# Patient Record
Sex: MALE | Race: BLACK OR AFRICAN AMERICAN | NOT HISPANIC OR LATINO | Employment: OTHER | URBAN - METROPOLITAN AREA
[De-identification: names, ages, dates, MRNs, and addresses within clinical notes are randomized per-mention and may not be internally consistent; named-entity substitution may affect disease eponyms.]

---

## 2017-07-17 ENCOUNTER — HOSPITAL ENCOUNTER (INPATIENT)
Facility: OTHER | Age: 77
LOS: 6 days | Discharge: HOME HEALTH | DRG: 853 | End: 2017-07-23
Attending: EMERGENCY MEDICINE | Admitting: INTERNAL MEDICINE
Payer: MEDICARE

## 2017-07-17 DIAGNOSIS — E11.8 TYPE 2 DIABETES MELLITUS WITH COMPLICATION, WITH LONG-TERM CURRENT USE OF INSULIN: ICD-10-CM

## 2017-07-17 DIAGNOSIS — I26.99 ACUTE PULMONARY EMBOLISM: ICD-10-CM

## 2017-07-17 DIAGNOSIS — I26.99 OTHER ACUTE PULMONARY EMBOLISM: ICD-10-CM

## 2017-07-17 DIAGNOSIS — Z99.2 ANEMIA IN CHRONIC KIDNEY DISEASE, ON CHRONIC DIALYSIS: ICD-10-CM

## 2017-07-17 DIAGNOSIS — R40.1 OBTUNDED: ICD-10-CM

## 2017-07-17 DIAGNOSIS — Z01.818 ENCOUNTER FOR INTUBATION: ICD-10-CM

## 2017-07-17 DIAGNOSIS — A41.9 SEPSIS DUE TO PNEUMONIA: Primary | ICD-10-CM

## 2017-07-17 DIAGNOSIS — R65.21 SEPTIC SHOCK: ICD-10-CM

## 2017-07-17 DIAGNOSIS — A41.9 SEPTIC SHOCK: ICD-10-CM

## 2017-07-17 DIAGNOSIS — R40.1 STUPOR: ICD-10-CM

## 2017-07-17 DIAGNOSIS — R41.82 ALTERED MENTAL STATE: ICD-10-CM

## 2017-07-17 DIAGNOSIS — D63.1 ANEMIA IN CHRONIC KIDNEY DISEASE, ON CHRONIC DIALYSIS: ICD-10-CM

## 2017-07-17 DIAGNOSIS — N18.6 ESRD ON DIALYSIS: ICD-10-CM

## 2017-07-17 DIAGNOSIS — D63.1 ANEMIA DUE TO PRE-ESRD TREATED WITH ERYTHROPOIETIN: ICD-10-CM

## 2017-07-17 DIAGNOSIS — E87.20 ACIDOSIS: ICD-10-CM

## 2017-07-17 DIAGNOSIS — N03.9 ANEMIA DUE TO PRE-ESRD TREATED WITH ERYTHROPOIETIN: ICD-10-CM

## 2017-07-17 DIAGNOSIS — R41.82 ALTERED MENTAL STATUS: ICD-10-CM

## 2017-07-17 DIAGNOSIS — R06.02 SOB (SHORTNESS OF BREATH): ICD-10-CM

## 2017-07-17 DIAGNOSIS — T50.905A MEDICATION REACTION, INITIAL ENCOUNTER: ICD-10-CM

## 2017-07-17 DIAGNOSIS — I26.99 PULMONARY EMBOLUS: ICD-10-CM

## 2017-07-17 DIAGNOSIS — N18.6 ESRD (END STAGE RENAL DISEASE): ICD-10-CM

## 2017-07-17 DIAGNOSIS — R41.82 ALTERED MENTAL STATUS, UNSPECIFIED ALTERED MENTAL STATUS TYPE: ICD-10-CM

## 2017-07-17 DIAGNOSIS — Z79.4 TYPE 2 DIABETES MELLITUS WITH COMPLICATION, WITH LONG-TERM CURRENT USE OF INSULIN: ICD-10-CM

## 2017-07-17 DIAGNOSIS — J18.9 SEPSIS DUE TO PNEUMONIA: Primary | ICD-10-CM

## 2017-07-17 DIAGNOSIS — D64.9 ANEMIA, UNSPECIFIED: ICD-10-CM

## 2017-07-17 DIAGNOSIS — J96.01 ACUTE RESPIRATORY FAILURE WITH HYPOXIA: ICD-10-CM

## 2017-07-17 DIAGNOSIS — N18.6 ANEMIA IN CHRONIC KIDNEY DISEASE, ON CHRONIC DIALYSIS: ICD-10-CM

## 2017-07-17 DIAGNOSIS — Z99.2 ESRD ON DIALYSIS: ICD-10-CM

## 2017-07-17 PROBLEM — E11.9 TYPE 2 DIABETES MELLITUS: Status: ACTIVE | Noted: 2017-07-17

## 2017-07-17 PROBLEM — N18.9 ANEMIA IN CHRONIC KIDNEY DISEASE: Status: ACTIVE | Noted: 2017-07-17

## 2017-07-17 PROBLEM — T82.599A MECHANICAL COMPLICATION OF VASCULAR DEVICE: Status: RESOLVED | Noted: 2017-07-17 | Resolved: 2017-07-17

## 2017-07-17 PROBLEM — T82.599A MECHANICAL COMPLICATION OF VASCULAR DEVICE: Status: ACTIVE | Noted: 2017-07-17

## 2017-07-17 LAB
ALBUMIN SERPL BCP-MCNC: 3.1 G/DL
ALLENS TEST: ABNORMAL
ALLENS TEST: ABNORMAL
ALP SERPL-CCNC: 84 U/L
ALT SERPL W/O P-5'-P-CCNC: 16 U/L
ANION GAP SERPL CALC-SCNC: 25 MMOL/L
AST SERPL-CCNC: 17 U/L
BASOPHILS # BLD AUTO: 0.01 K/UL
BASOPHILS NFR BLD: 0.1 %
BILIRUB SERPL-MCNC: 0.8 MG/DL
BNP SERPL-MCNC: 324 PG/ML
BUN SERPL-MCNC: 92 MG/DL
CALCIUM SERPL-MCNC: 8.8 MG/DL
CHLORIDE SERPL-SCNC: 100 MMOL/L
CO2 SERPL-SCNC: 16 MMOL/L
CREAT SERPL-MCNC: 10.8 MG/DL
DELSYS: ABNORMAL
DELSYS: ABNORMAL
DIFFERENTIAL METHOD: ABNORMAL
EOSINOPHIL # BLD AUTO: 0 K/UL
EOSINOPHIL NFR BLD: 0 %
ERYTHROCYTE [DISTWIDTH] IN BLOOD BY AUTOMATED COUNT: 14.6 %
ERYTHROCYTE [SEDIMENTATION RATE] IN BLOOD BY WESTERGREN METHOD: 16 MM/H
ERYTHROCYTE [SEDIMENTATION RATE] IN BLOOD BY WESTERGREN METHOD: 16 MM/H
EST. GFR  (AFRICAN AMERICAN): 5 ML/MIN/1.73 M^2
EST. GFR  (NON AFRICAN AMERICAN): 4 ML/MIN/1.73 M^2
GLUCOSE SERPL-MCNC: 117 MG/DL
HCO3 UR-SCNC: 13.3 MMOL/L (ref 24–28)
HCO3 UR-SCNC: 14.9 MMOL/L (ref 24–28)
HCT VFR BLD AUTO: 29.8 %
HGB BLD-MCNC: 10.4 G/DL
LYMPHOCYTES # BLD AUTO: 0.8 K/UL
LYMPHOCYTES NFR BLD: 6 %
MCH RBC QN AUTO: 32.8 PG
MCHC RBC AUTO-ENTMCNC: 34.9 %
MCV RBC AUTO: 94 FL
MODE: ABNORMAL
MODE: ABNORMAL
MONOCYTES # BLD AUTO: 1.2 K/UL
MONOCYTES NFR BLD: 9.8 %
NEUTROPHILS # BLD AUTO: 10.5 K/UL
NEUTROPHILS NFR BLD: 83.7 %
PCO2 BLDA: 27 MMHG (ref 35–45)
PCO2 BLDA: 36.8 MMHG (ref 35–45)
PEEP: 5
PEEP: 5
PH SMN: 7.22 [PH] (ref 7.35–7.45)
PH SMN: 7.3 [PH] (ref 7.35–7.45)
PLATELET # BLD AUTO: 205 K/UL
PMV BLD AUTO: 11.1 FL
PO2 BLDA: 267 MMHG (ref 80–100)
PO2 BLDA: 28 MMHG (ref 80–100)
POC BE: -13 MMOL/L
POC BE: -13 MMOL/L
POC SATURATED O2: 100 % (ref 95–100)
POC SATURATED O2: 42 % (ref 95–100)
POCT GLUCOSE: 135 MG/DL (ref 70–110)
POTASSIUM SERPL-SCNC: 4.3 MMOL/L
PROT SERPL-MCNC: 6.9 G/DL
RBC # BLD AUTO: 3.17 M/UL
SAMPLE: ABNORMAL
SAMPLE: ABNORMAL
SITE: ABNORMAL
SITE: ABNORMAL
SODIUM SERPL-SCNC: 141 MMOL/L
SP02: 60
SP02: 60
TROPONIN I SERPL DL<=0.01 NG/ML-MCNC: 0.02 NG/ML
VT: 480
VT: 480
WBC # BLD AUTO: 12.57 K/UL

## 2017-07-17 PROCEDURE — 99291 CRITICAL CARE FIRST HOUR: CPT | Mod: 25

## 2017-07-17 PROCEDURE — 25000003 PHARM REV CODE 250: Performed by: EMERGENCY MEDICINE

## 2017-07-17 PROCEDURE — 80053 COMPREHEN METABOLIC PANEL: CPT

## 2017-07-17 PROCEDURE — 05CY3ZZ EXTIRPATION OF MATTER FROM UPPER VEIN, PERCUTANEOUS APPROACH: ICD-10-PCS | Performed by: INTERNAL MEDICINE

## 2017-07-17 PROCEDURE — 93010 ELECTROCARDIOGRAM REPORT: CPT | Mod: ,,, | Performed by: INTERNAL MEDICINE

## 2017-07-17 PROCEDURE — 5A1945Z RESPIRATORY VENTILATION, 24-96 CONSECUTIVE HOURS: ICD-10-PCS | Performed by: HOSPITALIST

## 2017-07-17 PROCEDURE — 0BH17EZ INSERTION OF ENDOTRACHEAL AIRWAY INTO TRACHEA, VIA NATURAL OR ARTIFICIAL OPENING: ICD-10-PCS | Performed by: EMERGENCY MEDICINE

## 2017-07-17 PROCEDURE — 36556 INSERT NON-TUNNEL CV CATH: CPT

## 2017-07-17 PROCEDURE — 85025 COMPLETE CBC W/AUTO DIFF WBC: CPT

## 2017-07-17 PROCEDURE — 83880 ASSAY OF NATRIURETIC PEPTIDE: CPT

## 2017-07-17 PROCEDURE — 3E03317 INTRODUCTION OF OTHER THROMBOLYTIC INTO PERIPHERAL VEIN, PERCUTANEOUS APPROACH: ICD-10-PCS | Performed by: INTERNAL MEDICINE

## 2017-07-17 PROCEDURE — 87040 BLOOD CULTURE FOR BACTERIA: CPT

## 2017-07-17 PROCEDURE — 96374 THER/PROPH/DIAG INJ IV PUSH: CPT

## 2017-07-17 PROCEDURE — 96361 HYDRATE IV INFUSION ADD-ON: CPT

## 2017-07-17 PROCEDURE — 31500 INSERT EMERGENCY AIRWAY: CPT

## 2017-07-17 PROCEDURE — 63600175 PHARM REV CODE 636 W HCPCS: Performed by: EMERGENCY MEDICINE

## 2017-07-17 PROCEDURE — 94002 VENT MGMT INPAT INIT DAY: CPT

## 2017-07-17 PROCEDURE — 84484 ASSAY OF TROPONIN QUANT: CPT

## 2017-07-17 PROCEDURE — 82962 GLUCOSE BLOOD TEST: CPT

## 2017-07-17 PROCEDURE — 96376 TX/PRO/DX INJ SAME DRUG ADON: CPT

## 2017-07-17 PROCEDURE — 93005 ELECTROCARDIOGRAM TRACING: CPT

## 2017-07-17 PROCEDURE — 94003 VENT MGMT INPAT SUBQ DAY: CPT

## 2017-07-17 PROCEDURE — 20000000 HC ICU ROOM

## 2017-07-17 PROCEDURE — 02HV33Z INSERTION OF INFUSION DEVICE INTO SUPERIOR VENA CAVA, PERCUTANEOUS APPROACH: ICD-10-PCS | Performed by: EMERGENCY MEDICINE

## 2017-07-17 PROCEDURE — 057Y3DZ DILATION OF UPPER VEIN WITH INTRALUMINAL DEVICE, PERCUTANEOUS APPROACH: ICD-10-PCS | Performed by: INTERNAL MEDICINE

## 2017-07-17 RX ORDER — CALCIUM ACETATE 667 MG/1
667 CAPSULE ORAL
Status: ON HOLD | COMMUNITY
End: 2017-07-23 | Stop reason: HOSPADM

## 2017-07-17 RX ORDER — NALOXONE HYDROCHLORIDE 1 MG/ML
1 INJECTION INTRAMUSCULAR; INTRAVENOUS; SUBCUTANEOUS ONCE
Status: COMPLETED | OUTPATIENT
Start: 2017-07-17 | End: 2017-07-17

## 2017-07-17 RX ORDER — ASPIRIN 81 MG/1
81 TABLET ORAL DAILY
COMMUNITY

## 2017-07-17 RX ORDER — NALOXONE HCL 0.4 MG/ML
2 VIAL (ML) INJECTION
Status: COMPLETED | OUTPATIENT
Start: 2017-07-17 | End: 2017-07-17

## 2017-07-17 RX ORDER — NALOXONE HYDROCHLORIDE 1 MG/ML
1 INJECTION INTRAMUSCULAR; INTRAVENOUS; SUBCUTANEOUS
Status: DISCONTINUED | OUTPATIENT
Start: 2017-07-17 | End: 2017-07-17

## 2017-07-17 RX ORDER — DOCUSATE SODIUM 100 MG/1
100 CAPSULE, LIQUID FILLED ORAL DAILY PRN
Status: ON HOLD | COMMUNITY
End: 2017-07-23 | Stop reason: HOSPADM

## 2017-07-17 RX ORDER — NALOXONE HYDROCHLORIDE 1 MG/ML
1 INJECTION INTRAMUSCULAR; INTRAVENOUS; SUBCUTANEOUS
Status: DISCONTINUED | OUTPATIENT
Start: 2017-07-17 | End: 2017-07-23 | Stop reason: HOSPADM

## 2017-07-17 RX ORDER — INSULIN LISPRO 100 [IU]/ML
8 INJECTION, SOLUTION INTRAVENOUS; SUBCUTANEOUS EVERY MORNING
Status: ON HOLD | COMMUNITY
End: 2017-07-23

## 2017-07-17 RX ORDER — INSULIN LISPRO 100 [IU]/ML
10 INJECTION, SOLUTION INTRAVENOUS; SUBCUTANEOUS NIGHTLY
Status: ON HOLD | COMMUNITY
End: 2017-07-23 | Stop reason: HOSPADM

## 2017-07-17 RX ADMIN — NALOXONE HYDROCHLORIDE 2 MG: 0.4 INJECTION, SOLUTION INTRAMUSCULAR; INTRAVENOUS; SUBCUTANEOUS at 07:07

## 2017-07-17 RX ADMIN — SODIUM CHLORIDE 1000 ML: 0.9 INJECTION, SOLUTION INTRAVENOUS at 07:07

## 2017-07-17 RX ADMIN — NALOXONE HYDROCHLORIDE 1 MG: 1 INJECTION PARENTERAL at 07:07

## 2017-07-17 RX ADMIN — NALOXONE HYDROCHLORIDE 1 MG: 1 INJECTION PARENTERAL at 06:07

## 2017-07-17 RX ADMIN — IOHEXOL 100 ML: 350 INJECTION, SOLUTION INTRAVENOUS at 11:07

## 2017-07-17 NOTE — ED PROVIDER NOTES
"Encounter Date: 7/17/2017    SCRIBE #1 NOTE: I, Zaynab Sprague, am scribing for, and in the presence of,  Dr. Pyle. I have scribed the entire note.       History     Chief Complaint   Patient presents with    Altered Mental Status     per EMS, pt rec'ed conscious sedation to AV fistula declottment; "unarousable" after procedure     Time seen by provider: 6:25 PM    This is a 77 y.o. male with DM, HLD, HTN, anemia, and ESRD who presents via EMS with complaint of unresponsive state. On arrival patient is on bag valve mask. Per EMS and outside nursing, patient had a percutaneous thrombectomy with venous angioplasty performed this afternoon. He had conscious sedation and was administered Versed 2 mg IV and Fentanyl 100 mcg IV at approximately 16:00. After the patient became responsive he was discharged from the clinic. At that time he went to dialysis. Per dialysis nurse, upon arrival patient appeared drowsy, but states this is not uncommon. After 30 minutes of dialysis, the nurse reports the patient became increasingly drowsy and somnolent. EMS was activated at that time. Per EMS, patient had a CBG of 117 en route. They used a BVM due to slowed respirations.         The history is provided by the EMS personnel and medical records. The history is limited by the condition of the patient.     Review of patient's allergies indicates:  No Known Allergies  Past Medical History:   Diagnosis Date    Abnormal blood electrolyte level 06/28/2016    Anemia in chr kidney dis     Dependence on renal dialysis 06/03/2014    Diabetes mellitus     Hyperlipidemia     Hypertension     Infection and inflammatory reaction due to cardiac device, implant, and graft 10/26/2011    Secondary hyperparathyroidism of renal origin 08/15/2011    Unspecified protein-calorie malnutrition 06/18/2013    Vitamin D deficiency 06/28/2016     Past Surgical History:   Procedure Laterality Date    VASCULAR SURGERY       History reviewed. No " "pertinent family history.  Social History   Substance Use Topics    Smoking status: Never Smoker    Smokeless tobacco: Never Used    Alcohol use No     Review of Systems   Unable to perform ROS: Patient unresponsive       Physical Exam     Vitals:    07/17/17 1832 07/17/17 1833 07/17/17 1834 07/17/17 1847   BP:   (!) 142/111    Pulse:  96  90   Resp:  19  (!) 30   Temp:       TempSrc:       SpO2:  100% 99% 100%   Weight: 91 kg (200 lb 9.9 oz)      Height: 5' 9" (1.753 m)       07/17/17 1906 07/17/17 1908 07/17/17 1916 07/17/17 1918   BP: (!) 67/36 (!) 59/23  (!) 68/30   Pulse: 84 84  88   Resp: (!) 28 (!) 27  18   Temp:   (!) 95.2 °F (35.1 °C)    TempSrc:   Rectal    SpO2: 100% 100%  100%   Weight:       Height:           Physical Exam    Nursing note and vitals reviewed.  Constitutional: He appears well-developed and well-nourished. He is not diaphoretic. No distress. He is intubated.   Patient unresponsive.    HENT:   Head: Normocephalic and atraumatic.   Oropharynx is clear and intact. Moist mucous membranes.   Eyes:   Nystagmus. Conjunctiva are pink, clear, and intact.   Neck: Normal range of motion. Neck supple.   Cardiovascular: Normal rate, regular rhythm, S1 normal, S2 normal, normal heart sounds and intact distal pulses. Exam reveals no gallop and no friction rub.    No murmur heard.  Good pulses.   Pulmonary/Chest: He is intubated. No respiratory distress. He has no wheezes. He has no rhonchi. He has no rales.   Agonal respirations. Good breath sounds with BVM.   Abdominal: Soft. Bowel sounds are normal. He exhibits no distension. There is no tenderness. There is no rebound and no guarding.   No audible bruits.   Musculoskeletal: Normal range of motion. He exhibits no edema or tenderness.   No lower extremity edema.    Neurological: He is unresponsive.   GCS 3.   Skin: Skin is warm and dry. No rash and no abscess noted. No erythema. No pallor.         ED Course   Intubation  Date/Time: 7/17/2017 6:34 " "PM  Performed by: DIANE ROSENTHAL  Authorized by: DIANE ROSENTHAL   Consent Done: Emergent Situation  Indications: respiratory failure  Intubation method: lighted stylet  Patient status: unconscious  Preoxygenation: BVM  Paralytic: none  Laryngoscope size: Mac 4  Tube size: 7.5 mm  Number of attempts: 2  Ventilation between attempts: BVM  Cords visualized: yes  Post-procedure assessment: chest rise and CO2 detector  Breath sounds: reduced on left  ETT to teeth: 26 cm  Tube secured with: adhesive tape  Chest x-ray interpreted by me.  Chest x-ray findings: endotracheal tube too low  Tube repositioned: tube repositioned successfully  Patient tolerance: Patient tolerated the procedure well with no immediate complications  Complications: No  Specimens: No  Implants: No    Central Line  Date/Time: 7/17/2017 10:43 PM  Location procedure was performed: [facility] EMERGENCY DEPARTMENT  Performed by: DIANE ROSENTHAL  Consent Done: Emergent Situation  Time out: Immediately prior to procedure a "time out" was called to verify the correct patient, procedure, equipment, support staff and site/side marked as required.  Indications: vascular access and hemodynamic monitoring    Anesthesia:  Local Anesthetic: lidocaine 1% without epinephrine  Anesthetic total: 2 mL  Preparation: skin prepped with chlorhexidine (without alcohol) (and chloraprep)  Skin prep agent dried: skin prep agent completely dried prior to procedure  Sterile barriers: all five maximum sterile barriers used - cap, mask, sterile gown, sterile gloves, and large sterile sheet  Hand hygiene: hand hygiene performed prior to central venous catheter insertion  Location details: right femoral  Site selection rationale: hemodialysis patient, right IJ not visualized, no blood flow visualized to left IJ, dialysis site on left.  Catheter type: triple lumen  Catheter size: 7 Fr  Ultrasound guidance: yes  Vessel Caliber: large, patent, compressibility normal  Vascular " Doppler: not done  Manometry: No   Number of attempts: 2  Estimated blood loss (mL): 2  Specimens: No  Implants: No  Post-procedure: line sutured and chlorhexidine patch  Complications: No    Critical Care  Date/Time: 7/17/2017 6:25 PM  Performed by: DIANE ROSENTHAL  Authorized by: DIANE ROSENTHAL   Direct patient critical care time: 65 minutes  Additional history critical care time: 10 minutes  Ordering / reviewing critical care time: 15 minutes  Documentation critical care time: 15 minutes  Consulting other physicians critical care time: 20 minutes  Consult with family critical care time: 10 minutes  Total critical care time (exclusive of procedural time) : 135 minutes  Critical care time was exclusive of separately billable procedures and treating other patients and teaching time.  Critical care was necessary to treat or prevent imminent or life-threatening deterioration of the following conditions: respiratory failure.  Critical care was time spent personally by me on the following activities: blood draw for specimens, development of treatment plan with patient or surrogate, discussions with consultants, interpretation of cardiac output measurements, evaluation of patient's response to treatment, examination of patient, obtaining history from patient or surrogate, ordering and performing treatments and interventions, ordering and review of laboratory studies, ordering and review of radiographic studies, review of old charts, re-evaluation of patient's condition and pulse oximetry.        Labs Reviewed   CBC W/ AUTO DIFFERENTIAL - Abnormal; Notable for the following:        Result Value    RBC 3.17 (*)     Hemoglobin 10.4 (*)     Hematocrit 29.8 (*)     MCH 32.8 (*)     RDW 14.6 (*)     Gran # 10.5 (*)     Lymph # 0.8 (*)     Mono # 1.2 (*)     Gran% 83.7 (*)     Lymph% 6.0 (*)     All other components within normal limits   COMPREHENSIVE METABOLIC PANEL - Abnormal; Notable for the following:     CO2 16 (*)      Glucose 117 (*)     BUN, Bld 92 (*)     Creatinine 10.8 (*)     Albumin 3.1 (*)     Anion Gap 25 (*)     eGFR if  5 (*)     eGFR if non  4 (*)     All other components within normal limits   POCT GLUCOSE - Abnormal; Notable for the following:     POCT Glucose 135 (*)     All other components within normal limits   TROPONIN I   B-TYPE NATRIURETIC PEPTIDE   POCT GLUCOSE MONITORING CONTINUOUS     EKG Readings: (Independently Interpreted)   Initial Reading: No STEMI.   Sinus tachycardia. Rate of 115. RBBB. No acute changes when compared to prior EKG.       X-Rays:   Independently Interpreted Readings:   Chest X-Ray: Post intubation: Endotracheal tube is above the socorro and tilted to the right.      Medical Decision Making:   History:   Old Medical Records: I decided to obtain old medical records.  Old Records Summarized: records from previous admission(s), records from clinic visits and records from another hospital.  Independently Interpreted Test(s):   I have ordered and independently interpreted X-rays - see prior notes.  I have ordered and independently interpreted EKG Reading(s) - see prior notes  Clinical Tests:   Lab Tests: Ordered and Reviewed  Radiological Study: Ordered and Reviewed  Medical Tests: Ordered and Reviewed  ED Management:  8:46 PM I consulted and discussed the case with the moonlighter who will come down and admit the patient to their services.            Scribe Attestation:   Scribe #1: I performed the above scribed service and the documentation accurately describes the services I performed. I attest to the accuracy of the note.    Attending Attestation:           Physician Attestation for Scribe:  Physician Attestation Statement for Scribe #1: I, Dr. Pyle, reviewed documentation, as scribed by Zaynab Sprague in my presence, and it is both accurate and complete.         Attending ED Notes:   Emergent and critical evaluation of 77-year-old male with agonal  respirations and unresponsive with a GCS of 15.  On arrival patient is on bag valve mask. Per EMS and outside nursing, patient had a percutaneous thrombectomy with venous angioplasty performed this afternoon. He had conscious sedation and was administered Versed 2 mg IV and Fentanyl 100 mcg IV at approximately 16:00.  Patient is immediately intubated and placed on ventilator.  Patient found to be hypotensive and bolused with IV normal saline with increase in blood pressure.  Despite infiltration of Narcan and bolus of IV normal saline patient's pressure dropped.  Central line was placed.  Time central line was placed patient's blood pressure was 99/61.  Patient has no elevation of white blood cell count.  H&H 10.4 and 29.8.  BUN/creatinine are 92 and 10.8.  CO2 of 16.  Blood glucose of 117.  Albumen 3.1.  Anion gap of 25.  BNP is 324.  No acute findings on CT of the head.  No acute findings on chest x-ray.  The patient and his family members are extensively counseled on the patient's diagnosis and treatment and the patient is admitted in critical condition.    Consulted and discussed patient with nephrologist who recommended PE study.  Moonlighter notified and will place order for PE study.    Consulted and discussed patient with moonlighter who will admit the patient.             ED Course     Clinical Impression:     1. Encounter for intubation                                 Saúl Cabezas MD  07/17/17 8715

## 2017-07-17 NOTE — ED TRIAGE NOTES
Pt reports to ED via [facility] unresponsive to sternal rub with agonal breathing, bagged by EMS, RR 8 when pt transferred from EMS stretcher to ED bed. Per EMS by had fistula declotted with Dr. Larsen today and given versed and fentanyl. Unable to obtain history from pt due to status. Dr. Morales and Dr. Mojica at bedside. + nystagmus

## 2017-07-18 ENCOUNTER — TELEPHONE (OUTPATIENT)
Dept: SLEEP MEDICINE | Facility: CLINIC | Age: 77
End: 2017-07-18

## 2017-07-18 PROBLEM — E11.8 TYPE 2 DIABETES MELLITUS WITH COMPLICATION, WITH LONG-TERM CURRENT USE OF INSULIN: Status: ACTIVE | Noted: 2017-07-17

## 2017-07-18 PROBLEM — Z79.4 TYPE 2 DIABETES MELLITUS WITH COMPLICATION, WITH LONG-TERM CURRENT USE OF INSULIN: Status: ACTIVE | Noted: 2017-07-17

## 2017-07-18 LAB
ALBUMIN SERPL BCP-MCNC: 2.8 G/DL
ALBUMIN SERPL BCP-MCNC: 2.8 G/DL
ALLENS TEST: ABNORMAL
ALP SERPL-CCNC: 74 U/L
ALP SERPL-CCNC: 74 U/L
ALT SERPL W/O P-5'-P-CCNC: 15 U/L
ALT SERPL W/O P-5'-P-CCNC: 16 U/L
AMMONIA PLAS-SCNC: 29 UMOL/L
ANION GAP SERPL CALC-SCNC: 24 MMOL/L
ANION GAP SERPL CALC-SCNC: 26 MMOL/L
APTT BLDCRRT: 34.3 SEC
APTT BLDCRRT: 34.6 SEC
APTT BLDCRRT: 40.6 SEC
APTT BLDCRRT: >150 SEC
APTT BLDCRRT: >150 SEC
AST SERPL-CCNC: 19 U/L
AST SERPL-CCNC: 19 U/L
B-OH-BUTYR BLD STRIP-SCNC: 3.6 MMOL/L
BASOPHILS # BLD AUTO: 0 K/UL
BASOPHILS # BLD AUTO: 0.01 K/UL
BASOPHILS NFR BLD: 0 %
BASOPHILS NFR BLD: 0.1 %
BILIRUB SERPL-MCNC: 0.5 MG/DL
BILIRUB SERPL-MCNC: 0.5 MG/DL
BUN SERPL-MCNC: 102 MG/DL
BUN SERPL-MCNC: 106 MG/DL
CALCIUM SERPL-MCNC: 7.5 MG/DL
CALCIUM SERPL-MCNC: 7.7 MG/DL
CHLORIDE SERPL-SCNC: 100 MMOL/L
CHLORIDE SERPL-SCNC: 100 MMOL/L
CO2 SERPL-SCNC: 14 MMOL/L
CO2 SERPL-SCNC: 15 MMOL/L
CREAT SERPL-MCNC: 13.9 MG/DL
CREAT SERPL-MCNC: 14.4 MG/DL
DELSYS: ABNORMAL
DIASTOLIC DYSFUNCTION: NO
DIFFERENTIAL METHOD: ABNORMAL
DIFFERENTIAL METHOD: ABNORMAL
EOSINOPHIL # BLD AUTO: 0 K/UL
EOSINOPHIL # BLD AUTO: 0 K/UL
EOSINOPHIL NFR BLD: 0 %
EOSINOPHIL NFR BLD: 0 %
ERYTHROCYTE [DISTWIDTH] IN BLOOD BY AUTOMATED COUNT: 14.9 %
ERYTHROCYTE [DISTWIDTH] IN BLOOD BY AUTOMATED COUNT: 15.1 %
ERYTHROCYTE [SEDIMENTATION RATE] IN BLOOD BY WESTERGREN METHOD: 23 MM/H
EST. GFR  (AFRICAN AMERICAN): 3 ML/MIN/1.73 M^2
EST. GFR  (AFRICAN AMERICAN): 3 ML/MIN/1.73 M^2
EST. GFR  (NON AFRICAN AMERICAN): 3 ML/MIN/1.73 M^2
EST. GFR  (NON AFRICAN AMERICAN): 3 ML/MIN/1.73 M^2
ESTIMATED AVG GLUCOSE: 114 MG/DL
ESTIMATED PA SYSTOLIC PRESSURE: 13.25
ETCO2: 25
FERRITIN SERPL-MCNC: 1149 NG/ML
FIO2: 40
GLOBAL PERICARDIAL EFFUSION: NORMAL
GLUCOSE SERPL-MCNC: 192 MG/DL
GLUCOSE SERPL-MCNC: 199 MG/DL
HBA1C MFR BLD HPLC: 5.6 %
HCO3 UR-SCNC: 12.7 MMOL/L (ref 24–28)
HCT VFR BLD AUTO: 27.1 %
HCT VFR BLD AUTO: 27.5 %
HGB BLD-MCNC: 9 G/DL
HGB BLD-MCNC: 9.2 G/DL
INR PPP: 1
IRON SERPL-MCNC: 60 UG/DL
LACTATE SERPL-SCNC: 0.9 MMOL/L
LACTATE SERPL-SCNC: 1.6 MMOL/L
LYMPHOCYTES # BLD AUTO: 0.7 K/UL
LYMPHOCYTES # BLD AUTO: 0.8 K/UL
LYMPHOCYTES NFR BLD: 4.5 %
LYMPHOCYTES NFR BLD: 5.3 %
MAGNESIUM SERPL-MCNC: 1.7 MG/DL
MCH RBC QN AUTO: 31.9 PG
MCH RBC QN AUTO: 32.2 PG
MCHC RBC AUTO-ENTMCNC: 33.2 %
MCHC RBC AUTO-ENTMCNC: 33.5 %
MCV RBC AUTO: 96 FL
MCV RBC AUTO: 96 FL
MIN VOL: 10.6
MODE: ABNORMAL
MONOCYTES # BLD AUTO: 1.1 K/UL
MONOCYTES # BLD AUTO: 1.6 K/UL
MONOCYTES NFR BLD: 10.1 %
MONOCYTES NFR BLD: 6.8 %
NEUTROPHILS # BLD AUTO: 13.5 K/UL
NEUTROPHILS # BLD AUTO: 13.6 K/UL
NEUTROPHILS NFR BLD: 85.1 %
NEUTROPHILS NFR BLD: 87.5 %
PCO2 BLDA: 24.9 MMHG (ref 35–45)
PEEP: 5
PH SMN: 7.32 [PH] (ref 7.35–7.45)
PHOSPHATE SERPL-MCNC: 7.3 MG/DL
PIP: 11
PLATELET # BLD AUTO: 195 K/UL
PLATELET # BLD AUTO: 199 K/UL
PLATELET # BLD AUTO: 199 K/UL
PMV BLD AUTO: 10.8 FL
PMV BLD AUTO: 10.8 FL
PMV BLD AUTO: 11.8 FL
PO2 BLDA: 146 MMHG (ref 80–100)
POC BE: -13 MMOL/L
POC SATURATED O2: 99 % (ref 95–100)
POCT GLUCOSE: 155 MG/DL (ref 70–110)
POCT GLUCOSE: 177 MG/DL (ref 70–110)
POCT GLUCOSE: 212 MG/DL (ref 70–110)
POCT GLUCOSE: 242 MG/DL (ref 70–110)
POTASSIUM SERPL-SCNC: 5.1 MMOL/L
POTASSIUM SERPL-SCNC: 5.5 MMOL/L
PROCALCITONIN SERPL IA-MCNC: 4.51 NG/ML
PROT SERPL-MCNC: 6.2 G/DL
PROT SERPL-MCNC: 6.2 G/DL
PROTHROMBIN TIME: 10.7 SEC
RBC # BLD AUTO: 2.82 M/UL
RBC # BLD AUTO: 2.86 M/UL
RETIRED EF AND QEF - SEE NOTES: 65 (ref 55–65)
SAMPLE: ABNORMAL
SATURATED IRON: 33 %
SITE: ABNORMAL
SODIUM SERPL-SCNC: 139 MMOL/L
SODIUM SERPL-SCNC: 140 MMOL/L
SP02: 100
TOTAL IRON BINDING CAPACITY: 182 UG/DL
TRANSFERRIN SERPL-MCNC: 123 MG/DL
TSH SERPL DL<=0.005 MIU/L-ACNC: 2.73 UIU/ML
VANCOMYCIN SERPL-MCNC: <1.1 UG/ML
VT: 480
WBC # BLD AUTO: 15.55 K/UL
WBC # BLD AUTO: 15.89 K/UL

## 2017-07-18 PROCEDURE — 80100016 HC MAINTENANCE HEMODIALYSIS

## 2017-07-18 PROCEDURE — 63600175 PHARM REV CODE 636 W HCPCS: Performed by: HOSPITALIST

## 2017-07-18 PROCEDURE — 94003 VENT MGMT INPAT SUBQ DAY: CPT

## 2017-07-18 PROCEDURE — 93307 TTE W/O DOPPLER COMPLETE: CPT

## 2017-07-18 PROCEDURE — 83605 ASSAY OF LACTIC ACID: CPT | Mod: 91

## 2017-07-18 PROCEDURE — 99291 CRITICAL CARE FIRST HOUR: CPT | Mod: GC,,, | Performed by: INTERNAL MEDICINE

## 2017-07-18 PROCEDURE — 99900035 HC TECH TIME PER 15 MIN (STAT)

## 2017-07-18 PROCEDURE — 82010 KETONE BODYS QUAN: CPT

## 2017-07-18 PROCEDURE — 82140 ASSAY OF AMMONIA: CPT

## 2017-07-18 PROCEDURE — 85025 COMPLETE CBC W/AUTO DIFF WBC: CPT | Mod: 91

## 2017-07-18 PROCEDURE — 36600 WITHDRAWAL OF ARTERIAL BLOOD: CPT

## 2017-07-18 PROCEDURE — 25000003 PHARM REV CODE 250: Performed by: INTERNAL MEDICINE

## 2017-07-18 PROCEDURE — 25500020 PHARM REV CODE 255: Performed by: EMERGENCY MEDICINE

## 2017-07-18 PROCEDURE — 99900026 HC AIRWAY MAINTENANCE (STAT)

## 2017-07-18 PROCEDURE — 36415 COLL VENOUS BLD VENIPUNCTURE: CPT

## 2017-07-18 PROCEDURE — 80053 COMPREHEN METABOLIC PANEL: CPT

## 2017-07-18 PROCEDURE — 80202 ASSAY OF VANCOMYCIN: CPT

## 2017-07-18 PROCEDURE — 85610 PROTHROMBIN TIME: CPT

## 2017-07-18 PROCEDURE — 99223 1ST HOSP IP/OBS HIGH 75: CPT | Mod: ,,, | Performed by: PSYCHIATRY & NEUROLOGY

## 2017-07-18 PROCEDURE — 87040 BLOOD CULTURE FOR BACTERIA: CPT

## 2017-07-18 PROCEDURE — 84100 ASSAY OF PHOSPHORUS: CPT

## 2017-07-18 PROCEDURE — 83735 ASSAY OF MAGNESIUM: CPT

## 2017-07-18 PROCEDURE — 85730 THROMBOPLASTIN TIME PARTIAL: CPT | Mod: 91

## 2017-07-18 PROCEDURE — 83540 ASSAY OF IRON: CPT

## 2017-07-18 PROCEDURE — 83605 ASSAY OF LACTIC ACID: CPT

## 2017-07-18 PROCEDURE — 31500 INSERT EMERGENCY AIRWAY: CPT

## 2017-07-18 PROCEDURE — 94761 N-INVAS EAR/PLS OXIMETRY MLT: CPT

## 2017-07-18 PROCEDURE — 20000000 HC ICU ROOM

## 2017-07-18 PROCEDURE — 82803 BLOOD GASES ANY COMBINATION: CPT

## 2017-07-18 PROCEDURE — 83036 HEMOGLOBIN GLYCOSYLATED A1C: CPT

## 2017-07-18 PROCEDURE — 82728 ASSAY OF FERRITIN: CPT

## 2017-07-18 PROCEDURE — 84443 ASSAY THYROID STIM HORMONE: CPT

## 2017-07-18 PROCEDURE — 84145 PROCALCITONIN (PCT): CPT

## 2017-07-18 PROCEDURE — 94770 HC EXHALED C02 TEST: CPT

## 2017-07-18 PROCEDURE — 63600175 PHARM REV CODE 636 W HCPCS: Performed by: INTERNAL MEDICINE

## 2017-07-18 PROCEDURE — 25000003 PHARM REV CODE 250: Performed by: HOSPITALIST

## 2017-07-18 PROCEDURE — 85730 THROMBOPLASTIN TIME PARTIAL: CPT

## 2017-07-18 RX ORDER — ASPIRIN 81 MG/1
81 TABLET ORAL DAILY
Status: DISCONTINUED | OUTPATIENT
Start: 2017-07-18 | End: 2017-07-23 | Stop reason: HOSPADM

## 2017-07-18 RX ORDER — IBUPROFEN 200 MG
24 TABLET ORAL
Status: DISCONTINUED | OUTPATIENT
Start: 2017-07-18 | End: 2017-07-19 | Stop reason: SDUPTHER

## 2017-07-18 RX ORDER — IBUPROFEN 200 MG
16 TABLET ORAL
Status: DISCONTINUED | OUTPATIENT
Start: 2017-07-18 | End: 2017-07-19 | Stop reason: SDUPTHER

## 2017-07-18 RX ORDER — SODIUM CHLORIDE 9 MG/ML
INJECTION, SOLUTION INTRAVENOUS ONCE
Status: DISCONTINUED | OUTPATIENT
Start: 2017-07-18 | End: 2017-07-21

## 2017-07-18 RX ORDER — GLUCAGON 1 MG
1 KIT INJECTION
Status: DISCONTINUED | OUTPATIENT
Start: 2017-07-18 | End: 2017-07-19 | Stop reason: SDUPTHER

## 2017-07-18 RX ORDER — HEPARIN SODIUM,PORCINE/D5W 25000/250
17 INTRAVENOUS SOLUTION INTRAVENOUS CONTINUOUS
Status: DISCONTINUED | OUTPATIENT
Start: 2017-07-18 | End: 2017-07-18

## 2017-07-18 RX ORDER — SEVELAMER CARBONATE 800 MG/1
800 TABLET, FILM COATED ORAL
Status: DISCONTINUED | OUTPATIENT
Start: 2017-07-18 | End: 2017-07-23 | Stop reason: HOSPADM

## 2017-07-18 RX ORDER — HEPARIN SODIUM,PORCINE/D5W 25000/250
17 INTRAVENOUS SOLUTION INTRAVENOUS CONTINUOUS
Status: DISCONTINUED | OUTPATIENT
Start: 2017-07-18 | End: 2017-07-21

## 2017-07-18 RX ORDER — INSULIN ASPART 100 [IU]/ML
1-10 INJECTION, SOLUTION INTRAVENOUS; SUBCUTANEOUS
Status: DISCONTINUED | OUTPATIENT
Start: 2017-07-18 | End: 2017-07-18

## 2017-07-18 RX ORDER — INSULIN ASPART 100 [IU]/ML
1-10 INJECTION, SOLUTION INTRAVENOUS; SUBCUTANEOUS EVERY 6 HOURS PRN
Status: DISCONTINUED | OUTPATIENT
Start: 2017-07-18 | End: 2017-07-21

## 2017-07-18 RX ORDER — HEPARIN SODIUM 5000 [USP'U]/ML
5000 INJECTION, SOLUTION INTRAVENOUS; SUBCUTANEOUS EVERY 8 HOURS
Status: DISCONTINUED | OUTPATIENT
Start: 2017-07-18 | End: 2017-07-18

## 2017-07-18 RX ORDER — SODIUM CHLORIDE 9 MG/ML
INJECTION, SOLUTION INTRAVENOUS
Status: DISCONTINUED | OUTPATIENT
Start: 2017-07-18 | End: 2017-07-23 | Stop reason: HOSPADM

## 2017-07-18 RX ORDER — SIMVASTATIN 40 MG/1
40 TABLET, FILM COATED ORAL NIGHTLY
Status: DISCONTINUED | OUTPATIENT
Start: 2017-07-18 | End: 2017-07-23 | Stop reason: HOSPADM

## 2017-07-18 RX ADMIN — INSULIN ASPART 4 UNITS: 100 INJECTION, SOLUTION INTRAVENOUS; SUBCUTANEOUS at 11:07

## 2017-07-18 RX ADMIN — PIPERACILLIN SODIUM AND TAZOBACTAM SODIUM 4.5 G: 4; .5 INJECTION, POWDER, LYOPHILIZED, FOR SOLUTION INTRAVENOUS at 10:07

## 2017-07-18 RX ADMIN — INSULIN ASPART 2 UNITS: 100 INJECTION, SOLUTION INTRAVENOUS; SUBCUTANEOUS at 06:07

## 2017-07-18 RX ADMIN — HEPARIN SODIUM AND DEXTROSE 17 UNITS/KG/HR: 10000; 5 INJECTION INTRAVENOUS at 03:07

## 2017-07-18 RX ADMIN — PIPERACILLIN SODIUM AND TAZOBACTAM SODIUM 4.5 G: 4; .5 INJECTION, POWDER, LYOPHILIZED, FOR SOLUTION INTRAVENOUS at 11:07

## 2017-07-18 RX ADMIN — VANCOMYCIN HYDROCHLORIDE 1500 MG: 1 INJECTION, POWDER, LYOPHILIZED, FOR SOLUTION INTRAVENOUS at 05:07

## 2017-07-18 NOTE — PLAN OF CARE
Massimo Barrios () [phone] or [phone]  -----------------------------    ATTN: TEAM LB PLANNING     PT ON VENT IN ICU  - PENDING CALL [facility]OLA GOMEZ - FOR ASSESMENT QUESTIONS     Blanca Feliciano RN  Case management 7/18/20175:51 PM  # [phone] (FAX) [phone]  --------------------------------------------     07/18/17 6248   Discharge Assessment   Assessment Type Discharge Planning Assessment

## 2017-07-18 NOTE — PLAN OF CARE
07/18/17 1048   ED Admissions Case Approval   ED Admissions Case Approval (!) CM Approved  (IP )

## 2017-07-18 NOTE — ASSESSMENT & PLAN NOTE
The patient mental status changes most likely represent an encephalopathy related to hypoperfusion secondary to hypotension noted during dialysis.  Other factors include metabolic factors being end-stage renal disease related problems.  The possibility of acute pulmonary disease needs to be ruled out.    Recommendations: Patient is gradually improving as far as mental status is considered.  He is being worked up for pulmonary problems by the other specialists.

## 2017-07-18 NOTE — NURSING
0100 report received pt care assumed. Pt is intubated able to PATINO per command and spontaneous. Introduced self to pt and instructed on assessment. Pt with no family . Oriented and reassured at all times. Will monitor

## 2017-07-18 NOTE — CONSULTS
[facility]  Neurology  Consult Note    Patient Name: Rahul Barrios  MRN: [id number]  Admission Date: 7/17/2017  Hospital Length of Stay: 1 days  Code Status: Full Code   Attending Provider: Kenny Jansen MD   Consulting Provider: Raghav Solorio MD  Primary Care Physician: Herb Maldonado MD  Principal Problem:<principal problem not specified>    Consults   Subjective:     Chief Complaint:  Change in mental status     HPI:   This is a 77-year-old -American male was brought to the emergency room for poorly responsive state with respiratory distress.  History from noted that the EMS and outside nursing, patient had a percutaneous thrombectomy with venous angioplasty performed earlier on the day of admission. He had conscious sedation and was administered Versed 2 mg IV and Fentanyl 100 mcg IV at approximately 16:00. After the patient became responsive he was discharged from the clinic following which he went to dialysis. Per dialysis nurse, upon arrival patient appeared drowsy, but states this is not uncommon. After 30 minutes of dialysis, the nurse reports the patient became increasingly drowsy and somnolent becoming poorly responsive with slowed respirations and low blood pressure.  He has a history of DM, HLD, HTN, anemia, and ESRD on dialysis.  He was intubated because of his respiratory distress.  No seizure activity was noted.         Past Medical History:   Diagnosis Date    Abnormal blood electrolyte level 06/28/2016    Anemia in chr kidney dis     Dependence on renal dialysis 06/03/2014    Diabetes mellitus     Hyperlipidemia     Hypertension     Infection and inflammatory reaction due to cardiac device, implant, and graft 10/26/2011    Secondary hyperparathyroidism of renal origin 08/15/2011    Unspecified protein-calorie malnutrition 06/18/2013    Vitamin D deficiency 06/28/2016       Past Surgical History:   Procedure Laterality Date    VASCULAR SURGERY          Review of patient's allergies indicates:  No Known Allergies    Current Neurological Medications: None    No current facility-administered medications on file prior to encounter.      Current Outpatient Prescriptions on File Prior to Encounter   Medication Sig    cinacalcet (SENSIPAR) 30 MG Tab Take 30 mg by mouth before dinner.    losartan (COZAAR) 50 MG tablet Take 50 mg by mouth once daily.    metoprolol succinate (TOPROL-XL) 50 MG 24 hr tablet Take 25 mg by mouth 2 (two) times daily.    simvastatin (ZOCOR) 40 MG tablet Take 40 mg by mouth every evening.    castor oil liquid Take by mouth every Tues, Thurs, Sat.    insulin aspart protamine-insulin aspart (NOVOLOG 70/30) 100 unit/mL (70-30) InPn pen Inject into the skin 2 (two) times daily before meals.    sevelamer carbonate (RENVELA) 800 mg Tab Take 800 mg by mouth 3 (three) times daily with meals.    vitamin renal formula, B-complex-vitamin c-folic acid, (NEPHROCAPS) 1 mg Cap Take 1 capsule by mouth once daily.     Family History     None        Social History Main Topics    Smoking status: Never Smoker    Smokeless tobacco: Never Used    Alcohol use No    Drug use: No    Sexual activity: No     Review of Systems   Unable to perform ROS: Intubated     Objective:     Vital Signs (Most Recent):  Temp: 98.6 °F (37 °C) (07/18/17 0315)  Pulse: 96 (07/18/17 1630)  Resp: 20 (07/18/17 1630)  BP: (!) 112/54 (07/18/17 1630)  SpO2: 100 % (07/18/17 1630) Vital Signs (24h Range):  Temp:  [95.2 °F (35.1 °C)-98.6 °F (37 °C)] 98.6 °F (37 °C)  Pulse:  [] 96  Resp:  [12-30] 20  SpO2:  [98 %-100 %] 100 %  BP: ()/() 112/54     Weight: 95.1 kg (209 lb 10.5 oz)  Body mass index is 30.96 kg/m².    Physical Exam   Constitutional: He appears well-developed and well-nourished. He appears lethargic.   HENT:   Head: Normocephalic and atraumatic.   Right Ear: Hearing normal.   Left Ear: Hearing normal.   Eyes:   Fundus examination shows sharp disc  margins.  Pupils are equal and reactive with EOM being full without nystagmus   Neck: Normal range of motion. Neck supple. Carotid bruit is not present.   Neurological: He appears lethargic. He displays atrophy (Examination is limited however the patient moves all 4 extremities symmetrically to external stimulation as well as spontaneously) and abnormal reflex (DTRs generally depressed to absent.). He displays no tremor (no tremor or other involuntary movements noted). A sensory deficit (symmetrical withdrawal to external stimulation) is present. No cranial nerve deficit (No facial asymmetry noted with facial movements.  Patient is intubated.  Corneals/gag reflexes normal. ). He exhibits normal muscle tone. Coordination (Examination is limited) and gait (Gait not tested) abnormal. He displays no Babinski's sign on the right side. He displays no Babinski's sign on the left side.   Mental status examination: Patient is lethargic but easily arousable and makes eye contact.  He does follow simple commands such as grasping my hands but is unable to verbalize as he is intubated.  He is not restless or agitated.   Vitals reviewed.           Significant Labs: All pertinent lab results from the past 24 hours have been reviewed.    Significant Imaging: I have reviewed all pertinent imaging results/findings within the past 24 hours.    Assessment and Plan:     Altered mental status    The patient mental status changes most likely represent an encephalopathy related to hypoperfusion secondary to hypotension noted during dialysis.  Other factors include metabolic factors being end-stage renal disease related problems.  The possibility of acute pulmonary disease needs to be ruled out.    Recommendations: Patient is gradually improving as far as mental status is considered.  He is being worked up for pulmonary problems by the other specialists.            VTE Risk Mitigation         Ordered     Medium Risk of VTE  Once       07/18/17 0037          Thank you for your consult. I will follow-up with patient. Please contact us if you have any additional questions.    Raghav Solorio MD  Neurology  [facility]

## 2017-07-18 NOTE — CONSULTS
Pulmonary / Critical Care Medicine  Consult Note    Primary Attending:  Kenny Jansen MD   Primary Team: Hospital Medicine   Consultant Attending: Arianna Belle MD   Consultant Fellow: Edmund Sandoval MD     Reason for Consult  Hypoxemic respiratory failure     History of Present Illness:  Mr. Barrios is a 77 year old gentleman with a history of hypertension, diabetes and ESRD, with recurrent complications with his vascular access.  He underwent an uncomplicated left arm AV fistula thrombectomy along with stenting of his left subclavian vein yesterday.  Afterwards, while at dialysis, he was found to be hypotensive and minimally responsive.  EMS was then called and he was brought to the ED.  Upon arrival he was found to be hypotensive and hypoxemic, and was given naltrexone and IVF with no improvement.  He was subsequently intubated and his hypoxemia quickly improved.  His mentation however, has persisted.  When I examined Mr. Barrios this morning, he was difficult to arouse, but following commands and without complaint.     Past Medical History:   Abnormal blood electrolyte level    Anemia in chr kidney dis    Dependence on renal dialysis    Diabetes mellitus    Hyperlipidemia    Hypertension    Infection and inflammatory reaction due to cardiac device, implant, and graft    Secondary hyperparathyroidism of renal origin    Unspecified protein-calorie malnutrition    Vitamin D deficiency        Past Surgical History:   VASCULAR SURGERY        Allergies:  No Known Allergies     Medications:   Home Medications:     cinacalcet (SENSIPAR) 30 MG Tab Take 30 mg by mouth before dinner.    losartan (COZAAR) 50 MG tablet Take 50 mg by mouth once daily.    metoprolol succinate (TOPROL-XL) 50 MG 24 hr tablet Take 25 mg by mouth 2 (two) times daily.    simvastatin (ZOCOR) 40 MG tablet Take 40 mg by mouth every evening.    castor oil liquid Take by mouth every Tues, Thurs, Sat.    insulin aspart  protamine-insulin aspart (NOVOLOG 70/30) 100 unit/mL (70-30) InPn pen Inject into the skin 2 (two) times daily before meals.    sevelamer carbonate (RENVELA) 800 mg Tab Take 800 mg by mouth 3 (three) times daily with meals.    vitamin renal formula, B-complex-vitamin c-folic acid, (NEPHROCAPS) 1 mg Cap Take 1 capsule by mouth once daily.         Current Medications:  Scheduled:   aspirin  81 mg Daily    piperacillin-tazobactam 4.5 g  4.5 g Q12H    sevelamer carbonate  800 mg TID WM    simvastatin  40 mg QHS     Continuous Infusions:   heparin  Stopped (07/18/17 1050)     PRN:   sodium chloride 0.9%, dextrose 50%, dextrose 50%, glucagon (human recombinant), glucose, glucose, heparin (PORCINE), heparin (PORCINE), insulin aspart, naloxone     Social History:  Tobacco: Denies   EtOH: Denies   Illicit Drugs: Denies   Occupation Data Unavailable.       Family History:  Mother: Non-contributory   Father: Non-contributory     Review of Systems:  · Other than those symptoms mentioned above, an extensive review of systems was unremarkable.     Vital Signs   Temp:            [95.2 °F (35.1 °C)-98.6 °F (37 °C)]   Pulse:             []   Resp:             [12-30]   BP:                 ()/()   SpO2:             [ %]    Physical Exam   Gen: intubated, no distress   HEENT: limited visualization due to endotracheal tube  conjunctivae/corneas clear. PERRL., pupils responsive   CVS: regular rate and rhythm, S1, S2 normal, no murmur   Chest: clear to auscultation bilaterally, normal respiratory effort and normal percussion bilaterally   Abdomen: soft, non-tender non-distended; bowel sounds normal   Ext: warm, well perfused and no cyanosis or edema, or clubbing   Skin: no rashes, no ecchymoses   Neuro: grossly non-focal, following commands   Lines: Central line, Day# 2      Labs    07/17/17  1849  07/18/17  0145 07/18/17  0630 07/18/17  0918 07/18/17  1049 07/18/17  1106   WBC 12.57  --  15.55* 15.89*  --    --   --    RBC 3.17*  --  2.86* 2.82*  --   --   --    HGB 10.4*  --  9.2* 9.0*  --   --   --    HCT 29.8*  --  27.5* 27.1*  --   --   --      --  199  199 195  --   --   --    MCV 94  --  96 96  --   --   --    MCH 32.8*  --  32.2* 31.9*  --   --   --    MCHC 34.9  --  33.5 33.2  --   --   --      --   --  140  --   --  139   K 4.3  --   --  5.1  --   --  5.5*     --   --  100  --   --  100   CO2 16*  --   --  14*  --   --  15*   BUN 92*  --   --  102*  --   --  106*   CREATININE 10.8*  --   --  13.9*  --   --  14.4*   MG  --   --   --  1.7  --   --   --    ALT 16  --   --  16  --   --  15   AST 17  --   --  19  --   --  19   ALKPHOS 84  --   --  74  --   --  74   BILITOT 0.8  --   --  0.5  --   --  0.5   PROT 6.9  --   --  6.2  --   --  6.2   ALBUMIN 3.1*  --   --  2.8*  --   --  2.8*   PH  --   < >  --   --   --  7.315*  --    PCO2  --   < >  --   --   --  24.9*  --    PO2  --   < >  --   --   --  146*  --    HCO3  --   < >  --   --   --  12.7*  --    POCSATURATED  --   < >  --   --   --  99  --    BE  --   < >  --   --   --  -13  --    INR  --   --  1.0  --   --   --   --    APTT  --   < > 34.3*  34.3*  34.3*  --  >150.0*  --   --    TROPONINI 0.019  --   --   --   --   --   --       Imaging CXR          CT Chest    CT Brain 07/18/2017 7/17 7/17 I personally reviewed the films and findings are:, No infiltrates; mild vascular prominence.    Small sub-segmental pulmonary emboli  No acute abnormalities.      Other Studies:   PFTs   None on file      Echo 7/18 Normal systolic function; no diastolic dysfunction; trace pericardial effusion      Micro Blood Cx 7/18 7/18 NGTD  NGTD      Assessment/Plan:  · Neuro:  · Acute encephalopathy:  · Suspect uremic encephalopathy given BUN  · CVS:  · Undifferentiated Shock (resolved)  · Etiology remains elusive.  · HDS at this point.  · Never required vasopressors.  · Pulmonary:  · Acute hypoxemic respiratory failure (resolved)  · Pulmonary  embolus:  · Hypoxemia improved.  · Continue heparin GTT for now.  Will probably need warfarin.  · FEN/GI:  · AGMA:  · Hyperkalemia:  · Needs HD today.  · Renal:  · ESRD:  · HD today  · Heme/ID:  · Concern for occult infection:  · AOCD:  · No indications for blood products at this time.  · No positive culture data at the moment.  · Currently receiving emperic broad spectrum antibiotics.  Will de-escalate as appropriate.  · Endocrine:  · Type 2 DM:  · Secondary hyperparathyroidism:  · Continue basal and sliding scale insulin  · PPx/Dispo:  · Continue heparin GTT.  · Anticipate extubation after HD tonight.    Edmund Sandoval MD  Pulmonary / Critical Care Fellow  Cell [phone]  07/18/2017  3:11 PM

## 2017-07-18 NOTE — PLAN OF CARE
Problem: Patient Care Overview  Goal: Plan of Care Review  Outcome: Ongoing (interventions implemented as appropriate)  Pt remains on [facility] vent with documented settings. Pt alert and follows commands. ABG done. Pt remains stable.

## 2017-07-18 NOTE — ED NOTES
Central line dressed and temp retaken. Pt now follows commands and is able to squeeze both hands when prompted. Pt continued to be educated on plan of care. Getachew navarrete turned back on.

## 2017-07-18 NOTE — PROGRESS NOTES
Report received. Patient received intubated with 7.5 ETT secured @25cm lip with settings charted. Will continue to monitor.

## 2017-07-18 NOTE — ED NOTES
Pt transported to CT and then to ICU bed 4. Pt on zoll monitor, pulse ox, and BP. Pt on portable vent. Pt accompanied by RN,tech, and RT. Pt becoming more awake and starting to reach up for tube and still responds to commands. Pt transferred over to ICU bed 4 and receiving nurse updated on pt, medications, pt belongings, and consent given to nurse. No questions or further needs requested by ICU staff and pt transferred in stable condition.

## 2017-07-18 NOTE — ED NOTES
Pt transported to CT via stretcher on zoll monitor and bagged by RT. Pt remained stable throughout transfer, CT, and return. Pt placed back on vent, BP cycling q15, cardiac monitor and pulse ox. Pt is able to open both eyes and responds still to painful stimuli. Pt sister is at bedside and updated on pt status. Pt is clearly visible from the nurses station.

## 2017-07-18 NOTE — TELEPHONE ENCOUNTER
----- Message from Izabel Umaña sent at 7/18/2017  8:29 AM CDT -----  Contact: [facility] / Nurse / ph# [phone]  In House Consult:      Pt: Rahul Barrios (mrn# [id number])  Room#:   BICU 04  Dx: Alternated Mental Status  / Possible Hyprofusion  Ordering Provider: Dr. Cordell Heredia   Requested provider: Dr. Raghav Solorio  Caller: [facility] /  / ICU ph# [phone]

## 2017-07-18 NOTE — NURSING
Mental status improving during dialysis. EEG canceled  for now. Will reorder stat if poor mentation upon extubation post dialysis.

## 2017-07-18 NOTE — NURSING
Client same level of consciousness after dialysis. Asked if he would like tube out and he shakes head no. Tears seen in pt eyes when asked if he wants tube out. Other wise will only follow command at times. Neuro pages to see if they would like to reorder EEG. Awaiting call back. Will reassess in AM.  ABSOLUTELY NO SEDATION TONIGHT.

## 2017-07-18 NOTE — H&P
"[facility] Medicine History and Physical      Admitting Physician: Cordell Heredia MD  Attending Physician: Hospital Medicine Staff  PCP: Herb Maldonado    Date of Admit: 7/17/2017    Chief Complaint     Altered Mental Status (per EMS, pt rec'ed conscious sedation to AV fistula declottment; "unarousable" after procedure)       Subjective:      History of Present Illness:  Rahul Barrios is a 77 y.o. AA male who  has a past medical history of Abnormal blood electrolyte level (06/28/2016); Anemia in chr kidney dis; Dependence on renal dialysis (06/03/2014); Diabetes mellitus; Hyperlipidemia; Hypertension; Infection and inflammatory reaction due to cardiac device, implant, and graft (10/26/2011); Secondary hyperparathyroidism of renal origin (08/15/2011); Unspecified protein-calorie malnutrition (06/18/2013); and Vitamin D deficiency (06/28/2016).. The patient presented to [facility] on 7/17/2017 with a primary complaint of Altered Mental Status (per EMS, pt rec'ed conscious sedation to AV fistula declottment; "unarousable" after procedure)      The patient was in their usual state of health until today when he had a procedure done to declot av fistula.  Pt received fentanyl 100mg, versed 2mg, heparin, and tPA for procedure then went for his dialysis.  Pt had progressively low blood pressures at HD and transported to ED for eval.  At presentation, bp was 59/23.  Pt had improved bp with 1L bolus ns, but decreased on reeval to 69/40.  Pt without change in mental status with no sedation.    Past Medical History:  Past Medical History:   Diagnosis Date    Abnormal blood electrolyte level 06/28/2016    Anemia in chr kidney dis     Dependence on renal dialysis 06/03/2014    Diabetes mellitus     Hyperlipidemia     Hypertension     Infection and inflammatory reaction due to cardiac device, implant, and graft 10/26/2011    Secondary hyperparathyroidism of renal origin 08/15/2011    " Unspecified protein-calorie malnutrition 06/18/2013    Vitamin D deficiency 06/28/2016       Past Surgical History:  Past Surgical History:   Procedure Laterality Date    VASCULAR SURGERY         Allergies:  Review of patient's allergies indicates:  No Known Allergies    Home Medications:  Prior to Admission medications    Medication Sig Start Date End Date Taking? Authorizing Provider   aspirin (ECOTRIN) 81 MG EC tablet Take 81 mg by mouth once daily.   Yes Historical Provider, MD   B COMPLEX W-C NO.20/FOLIC ACID (KRISTIAN CAPS ORAL) Take 1 capsule by mouth once daily at 6am.   Yes Historical Provider, MD   calcium acetate (PHOSLO) 667 mg capsule Take 667 mg by mouth 3 (three) times daily with meals.   Yes Historical Provider, MD   cinacalcet (SENSIPAR) 30 MG Tab Take 30 mg by mouth before dinner.   Yes Historical Provider, MD   docusate sodium (COLACE) 100 MG capsule Take 100 mg by mouth daily as needed for Constipation.   Yes Historical Provider, MD   insulin lispro (HUMALOG) 100 unit/mL injection Inject 8 Units into the skin every morning.   Yes Historical Provider, MD   insulin lispro (HUMALOG) 100 unit/mL injection Inject 10 Units into the skin every evening.   Yes Historical Provider, MD   losartan (COZAAR) 50 MG tablet Take 50 mg by mouth once daily.   Yes Historical Provider, MD   metoprolol succinate (TOPROL-XL) 50 MG 24 hr tablet Take 25 mg by mouth 2 (two) times daily.   Yes Historical Provider, MD   simvastatin (ZOCOR) 40 MG tablet Take 40 mg by mouth every evening.   Yes Historical Provider, MD   castor oil liquid Take by mouth every Tues, Thurs, Sat.    Historical Provider, MD   insulin aspart protamine-insulin aspart (NOVOLOG 70/30) 100 unit/mL (70-30) InPn pen Inject into the skin 2 (two) times daily before meals.    Historical Provider, MD   sevelamer carbonate (RENVELA) 800 mg Tab Take 800 mg by mouth 3 (three) times daily with meals.    Historical Provider, MD   vitamin renal formula,  "B-complex-vitamin c-folic acid, (NEPHROCAPS) 1 mg Cap Take 1 capsule by mouth once daily.    Historical Provider, MD       Family History:  History reviewed. No pertinent family history.    Social History:  Social History   Substance Use Topics    Smoking status: Never Smoker    Smokeless tobacco: Never Used    Alcohol use No       Review of Systems:  Unable to complete ROS 2/2 mental status        Objective:   Last 24 Hour Vital Signs:  BP  Min: 59/23  Max: 144/69  Temp  Av °F (36.1 °C)  Min: 95.2 °F (35.1 °C)  Max: 97.9 °F (36.6 °C)  Pulse  Av.7  Min: 73  Max: 102  Resp  Av.4  Min: 12  Max: 30  SpO2  Av.3 %  Min: 94 %  Max: 100 %  Height  Av' 10.3" (178.6 cm)  Min: 5' 9" (175.3 cm)  Max: 5' 11" (180.3 cm)  Weight  Av.8 kg (206 lb 14 oz)  Min: 91 kg (200 lb 9.9 oz)  Max: 95.3 kg (210 lb)  Body mass index is 29.63 kg/m².  No intake/output data recorded.    Physical Examination:    GEN: not responsive, intubated  Head: atraumatic, normocephalic  EEN: surgical pupil on left, no movements, et tube in place, mmm  T: neck supple, JVP ~8   Lungs: CTA b, no wheezes, or rhonchi, no accessory muscle use  Heart: rrr, no mrg  Abd: s, ntnd +bs   Ext: no c/c/e, capillary refill ~2 sec  Lymph: no cervical, axillary, inguinal LAD  Skin: no rashes or lesions  Psych: not responsive   Neuro: pupils not responsive, no tracking, +right saccadic movements bilateral.  no focal deficits, no gag on intubation. Corneal reflex intact.  Pt squeezes right hand on command, makes facial grimaces to loud voice.  No responsiveness on right side to noxious stimuli.      Laboratory:  Most Recent Data:  CBC: Lab Results   Component Value Date    WBC 12.57 2017    HGB 10.4 (L) 2017    HCT 29.8 (L) 2017     2017    MCV 94 2017    RDW 14.6 (H) 2017     BMP: Lab Results   Component Value Date     2017    K 4.3 2017     2017    CO2 16 (L) 2017    " BUN 92 (H) 07/17/2017    CREATININE 10.8 (H) 07/17/2017     (H) 07/17/2017    CALCIUM 8.8 07/17/2017     LFTs: Lab Results   Component Value Date    PROT 6.9 07/17/2017    ALBUMIN 3.1 (L) 07/17/2017    BILITOT 0.8 07/17/2017    AST 17 07/17/2017    ALKPHOS 84 07/17/2017    ALT 16 07/17/2017     Coags:   Lab Results   Component Value Date    INR 0.9 11/21/2008     FLP: Lab Results   Component Value Date    CHOL 306 (H) 11/21/2008    HDL 42 11/21/2008    LDLCALC 220.6 (H) 11/21/2008    TRIG 217 (H) 11/21/2008    CHOLHDL 13.7 (L) 11/21/2008     DM: Lab Results   Component Value Date    HGBA1C 7.6 (H) 11/21/2008    LDLCALC 220.6 (H) 11/21/2008    CREATININE 10.8 (H) 07/17/2017     Thyroid: Lab Results   Component Value Date    TSH 2.44 11/21/2008     Anemia: No results found for: IRON, TIBC, FERRITIN, [id number], FOLATE  Cardiac: Lab Results   Component Value Date    TROPONINI 0.019 07/17/2017     (H) 07/17/2017     Urinalysis: No results found for: LABURIN, COLORU, CLARITYU, SPECGRAV, LABSPEC, NITRITE, PROTEINUR, GLUCOSEU, KETONESU, UROBILINOGEN, BILIRUBINUR, BLOODU, RBCU, WBCUA    Trended Lab Data:    Recent Labs  Lab 07/17/17  1849   WBC 12.57   HGB 10.4*   HCT 29.8*      MCV 94   RDW 14.6*      K 4.3      CO2 16*   BUN 92*   CREATININE 10.8*   *   PROT 6.9   ALBUMIN 3.1*   BILITOT 0.8   AST 17   ALKPHOS 84   ALT 16       Trended Cardiac Data:    Recent Labs  Lab 07/17/17  1849   TROPONINI 0.019   *       Other Results:  EKG (my interpretation): RBBB, 1st degree AV block.    Radiology:  Imaging Results          CT Head Without Contrast (Final result)  Result time 07/17/17 20:32:24    Final result by Celeste Lawson MD (07/17/17 20:32:24)                 Impression:        1.  No acute intracranial findings definitively seen. Further evaluation/followup as warranted.    2.  Senescent changes as above.      Electronically signed by: CELESTE LAWSON MD, MD  Date:      07/17/17  Time:    20:32              Narrative:    COMPARISON: None    FINDINGS: Patient is rotated and tilted within the scanner. There is beam hardening with streak artifact from patient motion slightly limits evaluation.    No definite evidence of hemorrhage, mass, midline shift or acute major vascular territory infarct.  Gray white interface appears intact.  There is age appropriate  generalized cerebral atrophy.  Moderate degree of patchy and confluent low attenuation changes throughout the subcortical and periventricular white matter, nonspecific but can be seen with chronic small vessel ischemic disease.  There is asymmetric dilatation of the frontal horn of the left lateral ventricle without significant overlying parenchymal atrophy or encephalomalacia, likely remote/developmental. The ventricles are otherwise midline without distortion by mass effect.  No extra-axial hemorrhage or mass. Skull base  vascular calcifications are noted.     The extracranial structures are within normal limits.  Calvarium is intact.  Mild mucosal thickening of the right maxillary sinus. Opacified bilateral posterior ethmoidal cells. Remaining imaged paranasal sinuses are clear.  Mastoid air cells are clear. Homogeneous hyperattenuated material identified within the anterior three quarters of the right globe, likely related to prior procedure.                             X-Ray Chest AP Portable (Final result)  Result time 07/17/17 19:02:56    Final result by Eduin Rasmussen MD (07/17/17 19:02:56)                 Impression:      As above        Electronically signed by: EDUIN RASMUSSEN MD  Date:     07/17/17  Time:    19:02              Narrative:    Chest AP portable    Indication:Tube placement    Comparison:None    Findings: Endotracheal tube tip lies approximately 2.3 cm above the socorro.  Left vascular stent noted. The cardiomediastinal silhouette is prominent noting calcification of aortic arch.  There is no pleural  effusion.  The trachea is midline.  The lungs are symmetrically expanded bilaterally with coarse interstitial attenuation bilaterally, possibly edema or accentuated by shallow inspiratory effort. No large focal consolidation seen.  There is no pneumothorax.  The osseous structures unremarkable for degenerative changes of the spine and shoulders noting possible remote posterior matter change of the distal left clavicle versus positioning.                               Assessment:     Rahul Barrios is a 77 y.o. male with:  Patient Active Problem List    Diagnosis Date Noted    Altered mental status 07/17/2017    Anemia in chronic kidney disease 07/17/2017    Acidosis 07/17/2017    Type 2 diabetes mellitus 07/17/2017    ESRD (end stage renal disease)     Failure of hemodialysis access 10/08/2015    Mechanical complication of other vascular device, implant, and graft 05/15/2014        Plan:     AMS  -current GCS- [id number]  -combinations of medications followed by hemodialysis potentially causing hypoperfusion injury.  Pt currently off all sedation, tolerating vent without difficulty.  Minimal, but present responsiveness.    -intubated for airway protection     Agonal breathing  -pt with rr ~30 on presentation.  Intubated for airway protection 2/2 AMS.  Discussed with nephrology who recommend PE study given recent procedure. Will CT PE study now.  HD in the morning for contrast.     ESRD  -consult nephrology    DM  -ssi    Anemia of chronic renal disease   -monitor    Dispo   -ICU.  Discussed status with patient's sister and brother.  Pt has 3 children, contact information not available at this time.     Critical care time >35 minutes.    Code Status:     Full     Cordell Heredia MD  [facility] Medicine  SpectraLink: [id number]  Pager: [phone]

## 2017-07-18 NOTE — ED NOTES
Charge nurse and tech attempting to call [facility] nephrology, wm remains on unit with pt. Pt eyes are open and pt is more responsive and able to grasp R hand when prompted to.

## 2017-07-18 NOTE — SUBJECTIVE & OBJECTIVE
Past Medical History:   Diagnosis Date    Abnormal blood electrolyte level 06/28/2016    Anemia in chr kidney dis     Dependence on renal dialysis 06/03/2014    Diabetes mellitus     Hyperlipidemia     Hypertension     Infection and inflammatory reaction due to cardiac device, implant, and graft 10/26/2011    Secondary hyperparathyroidism of renal origin 08/15/2011    Unspecified protein-calorie malnutrition 06/18/2013    Vitamin D deficiency 06/28/2016       Past Surgical History:   Procedure Laterality Date    VASCULAR SURGERY         Review of patient's allergies indicates:  No Known Allergies    Current Neurological Medications: None    No current facility-administered medications on file prior to encounter.      Current Outpatient Prescriptions on File Prior to Encounter   Medication Sig    cinacalcet (SENSIPAR) 30 MG Tab Take 30 mg by mouth before dinner.    losartan (COZAAR) 50 MG tablet Take 50 mg by mouth once daily.    metoprolol succinate (TOPROL-XL) 50 MG 24 hr tablet Take 25 mg by mouth 2 (two) times daily.    simvastatin (ZOCOR) 40 MG tablet Take 40 mg by mouth every evening.    castor oil liquid Take by mouth every Tues, Thurs, Sat.    insulin aspart protamine-insulin aspart (NOVOLOG 70/30) 100 unit/mL (70-30) InPn pen Inject into the skin 2 (two) times daily before meals.    sevelamer carbonate (RENVELA) 800 mg Tab Take 800 mg by mouth 3 (three) times daily with meals.    vitamin renal formula, B-complex-vitamin c-folic acid, (NEPHROCAPS) 1 mg Cap Take 1 capsule by mouth once daily.     Family History     None        Social History Main Topics    Smoking status: Never Smoker    Smokeless tobacco: Never Used    Alcohol use No    Drug use: No    Sexual activity: No     Review of Systems   Unable to perform ROS: Intubated     Objective:     Vital Signs (Most Recent):  Temp: 98.6 °F (37 °C) (07/18/17 0315)  Pulse: 96 (07/18/17 1630)  Resp: 20 (07/18/17 1630)  BP: (!) 112/54  (07/18/17 1630)  SpO2: 100 % (07/18/17 1630) Vital Signs (24h Range):  Temp:  [95.2 °F (35.1 °C)-98.6 °F (37 °C)] 98.6 °F (37 °C)  Pulse:  [] 96  Resp:  [12-30] 20  SpO2:  [98 %-100 %] 100 %  BP: ()/() 112/54     Weight: 95.1 kg (209 lb 10.5 oz)  Body mass index is 30.96 kg/m².    Physical Exam   Constitutional: He appears well-developed and well-nourished. He appears lethargic.   HENT:   Head: Normocephalic and atraumatic.   Right Ear: Hearing normal.   Left Ear: Hearing normal.   Eyes:   Fundus examination shows sharp disc margins.  Pupils are equal and reactive with EOM being full without nystagmus   Neck: Normal range of motion. Neck supple. Carotid bruit is not present.   Neurological: He appears lethargic. He displays atrophy (Examination is limited however the patient moves all 4 extremities symmetrically to external stimulation as well as spontaneously) and abnormal reflex (DTRs generally depressed to absent.). He displays no tremor (no tremor or other involuntary movements noted). A sensory deficit (symmetrical withdrawal to external stimulation) is present. No cranial nerve deficit (No facial asymmetry noted with facial movements.  Patient is intubated.  Corneals/gag reflexes normal. ). He exhibits normal muscle tone. Coordination (Examination is limited) and gait (Gait not tested) abnormal. He displays no Babinski's sign on the right side. He displays no Babinski's sign on the left side.   Mental status examination: Patient is lethargic but easily arousable and makes eye contact.  He does follow simple commands such as grasping my hands but is unable to verbalize as he is intubated.  He is not restless or agitated.   Vitals reviewed.           Significant Labs: All pertinent lab results from the past 24 hours have been reviewed.    Significant Imaging: I have reviewed all pertinent imaging results/findings within the past 24 hours.

## 2017-07-18 NOTE — CONSULTS
[facility] Consult Note    Consult Requested by:  Dr. Jansen  Reason for Consult:  ESRD evaluation and management    SUBJECTIVE:     History of Present Illness:  Patient is a 77 y.o. male with history of ESRD (MWF at [facility] with Dr. Maldonado), HTN, DM who presents with acute decompensation after having had a thrombectomy procedure yesterday.  He completed the procedure around 2pm and was discharged from the post-op holding room and went to dialysis for routine HD.  Apparently he became more somnolent as the procedure continued, and then EMS was called to transfer him to the ED.  Presented to the ED at 630pm.    Per the dialysis nurse, he showed up at the dialysis unit alert, talkative and responsive (mildly somnolent, which she thought was appropriate for recent conscious sedation).  His BP then dropped during the treatment (after more than an hour), and became unarousable, was given saline and no response from a hemodynamic standpoint, breathing became more labored so then EMS was called.      Upon presentation to the ED, he was intubated and started on pressors.  He was given 2 doses of narcan with no response.  His PO2 improved drastically with intubation.  CT showed multiple R-sided PE's.  He is currently intubated and is unable to give me a subjective history.      Review of patient's allergies indicates:  No Known Allergies    Past Medical History:   Diagnosis Date    Abnormal blood electrolyte level 06/28/2016    Anemia in chr kidney dis     Dependence on renal dialysis 06/03/2014    Diabetes mellitus     Hyperlipidemia     Hypertension     Infection and inflammatory reaction due to cardiac device, implant, and graft 10/26/2011    Secondary hyperparathyroidism of renal origin 08/15/2011    Unspecified protein-calorie malnutrition 06/18/2013    Vitamin D deficiency 06/28/2016     Past Surgical History:   Procedure Laterality Date    VASCULAR SURGERY       History reviewed. No pertinent  family history.  Social History   Substance Use Topics    Smoking status: Never Smoker    Smokeless tobacco: Never Used    Alcohol use No       Review of Systems:  Review of systems not obtained due to patient factors intubated and somnolent.    OBJECTIVE:     Vital Signs:  Temp:  [97.6 °F (36.4 °C)-98.6 °F (37 °C)]   Pulse:  [84-94]   Resp:  [16-25]   BP: ()/(42-62)   SpO2:  [99 %-100 %]     Physical Exam:  GEN:  appears acutely ill, intubated and not responsive to voice, comfortably lying in bed, in no acute distress  HEENT/NECK:  anicteric sclera, ET tube in place, no apparent JVD  CVS:  RRR with no murmurs or rubs  PULM:  CTAB on anterior auscultation  ABD:  soft, nontender, nondistended, normal bowel sounds  EXTR:  no edema  ACCESS:  LUE AVG with thrill    Laboratory:  CBC:   Recent Labs  Lab 07/18/17  0630   WBC 15.89*   RBC 2.82*   HGB 9.0*   HCT 27.1*      MCV 96   MCH 31.9*   MCHC 33.2     CMP:   Recent Labs  Lab 07/18/17  0630   *   CALCIUM 7.7*   ALBUMIN 2.8*   PROT 6.2      K 5.1   CO2 14*      *   CREATININE 13.9*   ALKPHOS 74   ALT 16   AST 19   BILITOT 0.5     Coagulation:   Recent Labs  Lab 07/18/17  0145 07/18/17  0918   LABPROT 10.7  --    INR 1.0  --    APTT 34.3*  34.3*  34.3* >150.0*     Cardiac markers:   Recent Labs  Lab 07/17/17  1849   TROPONINI 0.019     ABGs:   Recent Labs  Lab 07/18/17  1049   PH 7.315*   PCO2 24.9*   PO2 146*   HCO3 12.7*   POCSATURATED 99   BE -13     Microbiology Results (last 7 days)     Procedure Component Value Units Date/Time    Blood culture (site 1) [[id number]] Collected:  07/17/17 1945    Order Status:  Completed Specimen:  Blood from Peripheral, Antecubital, Right Updated:  07/18/17 0915     Blood Culture, Routine No Growth to date    Narrative:       Site # 1, aerobic and anaerobic (central line, if patient has  one)    Blood culture (site 2) [[id number]] Collected:  07/18/17 0116    Order Status:  Sent Specimen:   Blood Updated:  07/18/17 0741          Diagnostic Results:  Labs: Reviewed  CT: Reviewed    ASSESSMENT/PLAN:     77 y.o. male with history of ESRD (MWF at [facility] with Dr. Maldonado), HTN, DM who presents with acute decompensation after having had a thrombectomy procedure yesterday.      1. ESRD - as above  - will dialyze today and thrice weekly while inpatient unless more urgent indications arise  - dose meds for GFR < 10  - strict I/O, daily weights  - avoid jaun, fleets    2. Acute decompensation and hypoxia  - differential includes PE, infiltrative process or hypoxia due to hypervolemia/CHF exacerbation, and less likely a result of sedation, as he was improving from a mental standpoint, showed up at dialysis, tolerated 1 hour and then decompensated  - would get echo to evaluate hemodynamics  - will attempt 2L UF as tolerated    3. Metabolic Acidosis, with lactic acidosis  - significant anion gap  - would repeat lactate  - cont to monitor, will modulate with HD    4. Anemia due to renal disease  - no EPO right now as he is acutely ill, with thromboembolic disease    5. MBD/hypocalcemia  - needs Vit D, binders  - high Ca bath    6. Hypoalbuminemia  - likely related to acute illness and inflammation vs dilutional  - possible component of chronic malnutrition  - cont to monitor      Simon Woods MD  [phone]

## 2017-07-18 NOTE — HPI
This is a 77-year-old -American male was brought to the emergency room for poorly responsive state with respiratory distress.  History from noted that the EMS and outside nursing, patient had a percutaneous thrombectomy with venous angioplasty performed earlier on the day of admission. He had conscious sedation and was administered Versed 2 mg IV and Fentanyl 100 mcg IV at approximately 16:00. After the patient became responsive he was discharged from the clinic following which he went to dialysis. Per dialysis nurse, upon arrival patient appeared drowsy, but states this is not uncommon. After 30 minutes of dialysis, the nurse reports the patient became increasingly drowsy and somnolent becoming poorly responsive with slowed respirations and low blood pressure.  He has a history of DM, HLD, HTN, anemia, and ESRD on dialysis.  He was intubated because of his respiratory distress.  No seizure activity was noted.

## 2017-07-18 NOTE — HOSPITAL COURSE
A CT scan of the head done in the emergency room showed no acute intracranial findings definitively seen. Senescent changes as above.  Since admission to the ICU the patients mental status has gradually improved with blood pressure control.  He is still somewhat lethargic and on ventilator.  Pulmonary workup is in progress.  He has been noted to be much more responsive today opening eyes and does shake head to work.  He has also been moving spontaneously in bed symmetrically.

## 2017-07-18 NOTE — ED NOTES
Pt opens his L eye with vigorous stimulation. BP cycling every 15 mins. MD aware of pressure and waiting to go to CT until BP stablalizes. Getachew navarrete placed on pt with 2 blankets. Pt remains on vent with O2 sats at 100%

## 2017-07-18 NOTE — PLAN OF CARE
Massimo Barrios (brother) [phone] or [phone]  -----------------------------    ATTN: TEAM DC PLANNING      PT ON VENT IN ICU  - PENDING CALL BACK BROTHER - FOR ASSESMENT QUESTIONS     Blanca Feliciano RN  Case management 7/18/20175:51 PM  # [phone] (FAX) [phone]     07/18/17 1754   Discharge Assessment   Assessment Type Discharge Planning Assessment

## 2017-07-19 PROBLEM — E11.10 DIABETIC KETOACIDOSIS WITHOUT COMA ASSOCIATED WITH TYPE 2 DIABETES MELLITUS: Status: ACTIVE | Noted: 2017-07-19

## 2017-07-19 PROBLEM — J96.01 ACUTE RESPIRATORY FAILURE WITH HYPOXIA: Status: ACTIVE | Noted: 2017-07-19

## 2017-07-19 PROBLEM — R41.82 ALTERED MENTAL STATUS: Status: RESOLVED | Noted: 2017-07-17 | Resolved: 2017-07-19

## 2017-07-19 PROBLEM — D63.1 ANEMIA DUE TO PRE-ESRD TREATED WITH ERYTHROPOIETIN: Status: ACTIVE | Noted: 2017-07-19

## 2017-07-19 PROBLEM — N03.9 ANEMIA DUE TO PRE-ESRD TREATED WITH ERYTHROPOIETIN: Status: ACTIVE | Noted: 2017-07-19

## 2017-07-19 PROBLEM — A41.9 SEPTIC SHOCK: Status: ACTIVE | Noted: 2017-07-19

## 2017-07-19 PROBLEM — R65.21 SEPTIC SHOCK: Status: ACTIVE | Noted: 2017-07-19

## 2017-07-19 LAB
ANION GAP SERPL CALC-SCNC: 20 MMOL/L
APTT BLDCRRT: 58.1 SEC
APTT BLDCRRT: 98.9 SEC
B-OH-BUTYR BLD STRIP-SCNC: 3.2 MMOL/L
BASOPHILS # BLD AUTO: 0.01 K/UL
BASOPHILS NFR BLD: 0.1 %
BUN SERPL-MCNC: 71 MG/DL
CALCIUM SERPL-MCNC: 7.8 MG/DL
CHLORIDE SERPL-SCNC: 100 MMOL/L
CO2 SERPL-SCNC: 20 MMOL/L
CREAT SERPL-MCNC: 11.3 MG/DL
DIFFERENTIAL METHOD: ABNORMAL
EOSINOPHIL # BLD AUTO: 0 K/UL
EOSINOPHIL NFR BLD: 0 %
ERYTHROCYTE [DISTWIDTH] IN BLOOD BY AUTOMATED COUNT: 15.1 %
EST. GFR  (AFRICAN AMERICAN): 4 ML/MIN/1.73 M^2
EST. GFR  (NON AFRICAN AMERICAN): 4 ML/MIN/1.73 M^2
GLUCOSE SERPL-MCNC: 156 MG/DL
HCT VFR BLD AUTO: 25.1 %
HGB BLD-MCNC: 8.6 G/DL
INR PPP: 0.9
LYMPHOCYTES # BLD AUTO: 0.8 K/UL
LYMPHOCYTES NFR BLD: 6.2 %
MAGNESIUM SERPL-MCNC: 1.6 MG/DL
MCH RBC QN AUTO: 32.3 PG
MCHC RBC AUTO-ENTMCNC: 34.3 %
MCV RBC AUTO: 94 FL
MONOCYTES # BLD AUTO: 1.8 K/UL
MONOCYTES NFR BLD: 14.9 %
NEUTROPHILS # BLD AUTO: 9.5 K/UL
NEUTROPHILS NFR BLD: 78.4 %
PHOSPHATE SERPL-MCNC: 5.2 MG/DL
PLATELET # BLD AUTO: 171 K/UL
PLATELET # BLD AUTO: 171 K/UL
PMV BLD AUTO: 11.2 FL
PMV BLD AUTO: 11.2 FL
POCT GLUCOSE: 177 MG/DL (ref 70–110)
POCT GLUCOSE: 198 MG/DL (ref 70–110)
POCT GLUCOSE: 219 MG/DL (ref 70–110)
POCT GLUCOSE: 230 MG/DL (ref 70–110)
POCT GLUCOSE: 233 MG/DL (ref 70–110)
POTASSIUM SERPL-SCNC: 4.1 MMOL/L
PROTHROMBIN TIME: 10.6 SEC
RBC # BLD AUTO: 2.66 M/UL
SODIUM SERPL-SCNC: 140 MMOL/L
VANCOMYCIN SERPL-MCNC: 22 UG/ML
WBC # BLD AUTO: 12.07 K/UL

## 2017-07-19 PROCEDURE — 85730 THROMBOPLASTIN TIME PARTIAL: CPT

## 2017-07-19 PROCEDURE — 99291 CRITICAL CARE FIRST HOUR: CPT | Mod: GC,,, | Performed by: INTERNAL MEDICINE

## 2017-07-19 PROCEDURE — 80100016 HC MAINTENANCE HEMODIALYSIS

## 2017-07-19 PROCEDURE — 80202 ASSAY OF VANCOMYCIN: CPT

## 2017-07-19 PROCEDURE — 85025 COMPLETE CBC W/AUTO DIFF WBC: CPT

## 2017-07-19 PROCEDURE — 82010 KETONE BODYS QUAN: CPT

## 2017-07-19 PROCEDURE — 99233 SBSQ HOSP IP/OBS HIGH 50: CPT | Mod: ,,, | Performed by: HOSPITALIST

## 2017-07-19 PROCEDURE — 63600175 PHARM REV CODE 636 W HCPCS: Performed by: INTERNAL MEDICINE

## 2017-07-19 PROCEDURE — 84100 ASSAY OF PHOSPHORUS: CPT

## 2017-07-19 PROCEDURE — 94761 N-INVAS EAR/PLS OXIMETRY MLT: CPT

## 2017-07-19 PROCEDURE — 25000003 PHARM REV CODE 250: Performed by: INTERNAL MEDICINE

## 2017-07-19 PROCEDURE — 20000000 HC ICU ROOM

## 2017-07-19 PROCEDURE — 95816 EEG AWAKE AND DROWSY: CPT

## 2017-07-19 PROCEDURE — 95819 EEG AWAKE AND ASLEEP: CPT | Mod: 26,,, | Performed by: PSYCHIATRY & NEUROLOGY

## 2017-07-19 PROCEDURE — 99900026 HC AIRWAY MAINTENANCE (STAT)

## 2017-07-19 PROCEDURE — 80048 BASIC METABOLIC PNL TOTAL CA: CPT

## 2017-07-19 PROCEDURE — 83735 ASSAY OF MAGNESIUM: CPT

## 2017-07-19 PROCEDURE — 85610 PROTHROMBIN TIME: CPT

## 2017-07-19 PROCEDURE — 99900035 HC TECH TIME PER 15 MIN (STAT)

## 2017-07-19 RX ORDER — SODIUM CHLORIDE 9 MG/ML
INJECTION, SOLUTION INTRAVENOUS
Status: DISCONTINUED | OUTPATIENT
Start: 2017-07-19 | End: 2017-07-23 | Stop reason: HOSPADM

## 2017-07-19 RX ORDER — IBUPROFEN 200 MG
24 TABLET ORAL
Status: DISCONTINUED | OUTPATIENT
Start: 2017-07-19 | End: 2017-07-23 | Stop reason: HOSPADM

## 2017-07-19 RX ORDER — IBUPROFEN 200 MG
16 TABLET ORAL
Status: DISCONTINUED | OUTPATIENT
Start: 2017-07-19 | End: 2017-07-23 | Stop reason: HOSPADM

## 2017-07-19 RX ORDER — INSULIN ASPART 100 [IU]/ML
0-5 INJECTION, SOLUTION INTRAVENOUS; SUBCUTANEOUS
Status: DISCONTINUED | OUTPATIENT
Start: 2017-07-19 | End: 2017-07-19

## 2017-07-19 RX ORDER — SODIUM CHLORIDE 9 MG/ML
INJECTION, SOLUTION INTRAVENOUS ONCE
Status: DISCONTINUED | OUTPATIENT
Start: 2017-07-19 | End: 2017-07-21

## 2017-07-19 RX ORDER — GLUCAGON 1 MG
1 KIT INJECTION
Status: DISCONTINUED | OUTPATIENT
Start: 2017-07-19 | End: 2017-07-23 | Stop reason: HOSPADM

## 2017-07-19 RX ADMIN — INSULIN ASPART 2 UNITS: 100 INJECTION, SOLUTION INTRAVENOUS; SUBCUTANEOUS at 06:07

## 2017-07-19 RX ADMIN — SEVELAMER CARBONATE 800 MG: 800 TABLET, FILM COATED ORAL at 11:07

## 2017-07-19 RX ADMIN — INSULIN ASPART 2 UNITS: 100 INJECTION, SOLUTION INTRAVENOUS; SUBCUTANEOUS at 02:07

## 2017-07-19 RX ADMIN — HEPARIN SODIUM AND DEXTROSE 12 UNITS/KG/HR: 10000; 5 INJECTION INTRAVENOUS at 02:07

## 2017-07-19 RX ADMIN — INSULIN ASPART 4 UNITS: 100 INJECTION, SOLUTION INTRAVENOUS; SUBCUTANEOUS at 08:07

## 2017-07-19 RX ADMIN — INSULIN ASPART 2 UNITS: 100 INJECTION, SOLUTION INTRAVENOUS; SUBCUTANEOUS at 11:07

## 2017-07-19 RX ADMIN — PIPERACILLIN SODIUM AND TAZOBACTAM SODIUM 4.5 G: 4; .5 INJECTION, POWDER, LYOPHILIZED, FOR SOLUTION INTRAVENOUS at 11:07

## 2017-07-19 RX ADMIN — INSULIN DETEMIR 5 UNITS: 100 INJECTION, SOLUTION SUBCUTANEOUS at 06:07

## 2017-07-19 RX ADMIN — SEVELAMER CARBONATE 800 MG: 800 TABLET, FILM COATED ORAL at 06:07

## 2017-07-19 RX ADMIN — SIMVASTATIN 40 MG: 40 TABLET, FILM COATED ORAL at 08:07

## 2017-07-19 NOTE — HOSPITAL COURSE
Patient intubated in the emergency department and admitted to the intensive care unit.  CT scan of the chest revealed bibasilar consolidation with air bronchograms, left greater than right and also small pulmonary embolus following recent de-clot procedure the day prior to presentation.  He was treated with broad-spectrum antibiotics and also started on a heparin infusion to treat acute pulmonary embolus.  Patient's blood pressure improved after he was intubated and he but not required vasopressor support.  Patient's mental status improved with dialysis and empiric broad-spectrum antibiotics.  Patient successfully extubated in the morning on 7/19/2017.  Mental status improving with treatment of pneumonia and further dialysis.  Patient also presented with significant edema on the left upper extremity where he had his de-clot procedure.  Ultrasounds did not reveal evidence of clot.  And with dialysis his swelling improving.    I spoke with Dr. Swati Hennessy who says that a small pulmonary embolus following de-clotting procedure is expected and these patients are not typically treated with anticoagulation. Echocardiogram does not show evidence of right-sided heart strain and he was no longer hypoxic after treatment for pneumonia and with volume removal with dialysis.  I suspect the patient's acute worsening mental status due to sedation from his recent declot procedure, uremia from inadequate dialysis due to nonfunctioning access, and also aspiration pneumonia.  Anticoagulation discontinued and he continued to do well.  Speech therapy evaluated patient and recommended measures to decrease risk of aspiration.    Patient stable to be discharged home today to complete a course of oral antibiotics to treat pneumonia and he is to resume outpatient dialysis starting tomorrow.  Patient and his brother advised to follow-up with Dr. Herb Maldonado in clinic early this upcoming week.  Insulin regimen adjusted for reasonable  glycemic control with goal of avoiding hypoglycemia.  Patient's has been normotensive off prior antihypertensive regimen and I have advised to hold his home blood pressure medications until he sees Dr. Maldonado in clinic.  Lastly, I have ordered home health to help monitor his blood pressure and diabetes and also in order for patient to receive therapy in the home setting.

## 2017-07-19 NOTE — PROGRESS NOTES
Progress Note  Nephrology      Consult Requested By: Kenny Jansen MD      SUBJECTIVE:     Overnight events  Patient is a 77 y.o. male presents with ESRD s/p declot of clotted LUE AVF with central stent placement admitted with hypoxia and altered mental status s/p intubated.    This AM opening eyes to voice.  Follows commands.  Per critical care, plan is to extubate.    He underwent HD for just under 3 hours yesterday. Almost 1L removed.  His left arm is swollen. Ultrasounds performed yesterday without any focal thrombus noted.     Patient Active Problem List   Diagnosis    Mechanical complication of other vascular device, implant, and graft    Failure of hemodialysis access    ESRD (end stage renal disease)    Altered mental status    Anemia in chronic kidney disease    Acidosis    Type 2 diabetes mellitus with complication, with long-term current use of insulin    ESRD on dialysis    Pulmonary embolus     Past Medical History:   Diagnosis Date    Abnormal blood electrolyte level 06/28/2016    Anemia in chr kidney dis     Dependence on renal dialysis 06/03/2014    Diabetes mellitus     Hyperlipidemia     Hypertension     Infection and inflammatory reaction due to cardiac device, implant, and graft 10/26/2011    Secondary hyperparathyroidism of renal origin 08/15/2011    Unspecified protein-calorie malnutrition 06/18/2013    Vitamin D deficiency 06/28/2016              OBJECTIVE:     Vitals:    07/19/17 0800 07/19/17 0808 07/19/17 0900 07/19/17 0937   BP: 130/60  131/60    Pulse: 76 76 82 82   Resp: 14 14 14 14   Temp:       TempSrc:       SpO2: 100% 100% 100% 100%   Weight:       Height:           Temp: 98.7 °F (37.1 °C) (07/19/17 0715)  Pulse: 82 (07/19/17 0937)  Resp: 14 (07/19/17 0937)  BP: 131/60 (07/19/17 0900)  SpO2: 100 % (07/19/17 0937)               Medications:   sodium chloride 0.9%   Intravenous Once    sodium chloride 0.9%   Intravenous Once    aspirin  81 mg Oral Daily     piperacillin-tazobactam 4.5 g in dextrose 5 % 100 mL IVPB (ready to mix system)  4.5 g Intravenous Q12H    sevelamer carbonate  800 mg Oral TID WM    simvastatin  40 mg Oral QHS      heparin (porcine) in D5W 9 Units/kg/hr (07/19/17 0430)               Physical Exam:  General appearance: intubated, awake but groggy a bit  CV: RRR  Abd: soft, Nontender  Ext: 1+ edema.  LUE has 2+ edema. There is a soft thrill in the LUE upper arm AVF.        Laboratory:  ABG  Labs reviewed  Recent Results (from the past 336 hour(s))   Basic metabolic panel    Collection Time: 07/19/17  5:37 AM   Result Value Ref Range    Sodium 140 136 - 145 mmol/L    Potassium 4.1 3.5 - 5.1 mmol/L    Chloride 100 95 - 110 mmol/L    CO2 20 (L) 23 - 29 mmol/L    BUN, Bld 71 (H) 8 - 23 mg/dL    Creatinine 11.3 (H) 0.5 - 1.4 mg/dL    Calcium 7.8 (L) 8.7 - 10.5 mg/dL    Anion Gap 20 (H) 8 - 16 mmol/L     Recent Results (from the past 336 hour(s))   CBC auto differential    Collection Time: 07/19/17  2:00 AM   Result Value Ref Range    WBC 12.07 3.90 - 12.70 K/uL    Hemoglobin 8.6 (L) 14.0 - 18.0 g/dL    Hematocrit 25.1 (L) 40.0 - 54.0 %    Platelets 171 150 - 350 K/uL   CBC auto differential    Collection Time: 07/18/17  6:30 AM   Result Value Ref Range    WBC 15.89 (H) 3.90 - 12.70 K/uL    Hemoglobin 9.0 (L) 14.0 - 18.0 g/dL    Hematocrit 27.1 (L) 40.0 - 54.0 %    Platelets 195 150 - 350 K/uL   CBC auto differential    Collection Time: 07/18/17  1:45 AM   Result Value Ref Range    WBC 15.55 (H) 3.90 - 12.70 K/uL    Hemoglobin 9.2 (L) 14.0 - 18.0 g/dL    Hematocrit 27.5 (L) 40.0 - 54.0 %    Platelets 199 150 - 350 K/uL     Urinalysis  No results for input(s): COLORU, CLARITYU, SPECGRAV, PHUR, PROTEINUA, GLUCOSEU, BILIRUBINCON, BLOODU, WBCU, RBCU, BACTERIA, MUCUS, NITRITE, LEUKOCYTESUR, UROBILINOGEN, HYALINECASTS in the last 24 hours.    Diagnostic Results:  X-Ray: Reviewed  US: Reviewed  Echo: Reviewed  ACCESS    ASSESSMENT/PLAN:     Patient Active  Problem List   Diagnosis    Mechanical complication of other vascular device, implant, and graft    Failure of hemodialysis access    ESRD (end stage renal disease)    Altered mental status    Anemia in chronic kidney disease    Acidosis    Type 2 diabetes mellitus with complication, with long-term current use of insulin    ESRD on dialysis    Pulmonary embolus       Plan:   1. ESRD: will try to UF today for volume overload x 2 hours    2. Mechanical complication AVF: s/p declot of left arm AVF with central stent placement.  Left arm is swollen- ultrasound without thrombosis.  We will follow and see what happens for now.  May need vasc surg evaluation for central vein stenosis as well.    3. Hypoxia/altered mental status: unclear etiology- possibly from PE, less likely sedation.  Improving today. On abx for possible PNA.  Cultures negative so far.    4. HTN: stable    5. Anemia ESRD: will give EPO today.     Swati Hennessy MD

## 2017-07-19 NOTE — ASSESSMENT & PLAN NOTE
Secondary to acute pulmonary embolus and also likely aspiration pneumonia.  Continue with antibiotic treatment as with elevated procalcitonin.  Status post extubated this morning doing well thus far.

## 2017-07-19 NOTE — ASSESSMENT & PLAN NOTE
Combination of renal failure along with degree of diabetic ketoacidosis and starvation ketosis.  Improving with dialysis and insulin,.

## 2017-07-19 NOTE — ASSESSMENT & PLAN NOTE
Secondary to aspiration pneumonia.  Improving with broad-spectrum antibiotic treatment.  Will continue pending culture results.

## 2017-07-19 NOTE — PLAN OF CARE
Massimo Barrios (brother) [phone] or [phone]  -----------------------------    ATTN: TEAM DC PLANNING      PT ON VENT  Yesterday   - PENDING CALL BACK [place] - FOR ASSESMENT QUESTIONS -      PER MD TEAM CONSULT Pt requests help at home     RECOMMENDATIONS:   PT/OT EVAL ( IN HOUSE ) POSSIBLE PLACEMENT     Blanca Feliciano RN  Case management # [phone] (FAX) [phone]  --------------------------------------------     07/19/17 1619   Discharge Assessment   Assessment Type Discharge Planning Reassessment

## 2017-07-19 NOTE — PLAN OF CARE
Problem: Patient Care Overview  Goal: Plan of Care Review  Outcome: Ongoing (interventions implemented as appropriate)  09:40 Patient extubated without parameters to room air per Dr.Cody Sandoval. Pt followed commands HR 85 sat's 97-99% BBS = coarse throughout, cough good amounts of secretion up.Pt tolerated extubation well.Will continue to monitor.

## 2017-07-19 NOTE — PLAN OF CARE
Problem: Ventilation, Mechanical Invasive (Adult)  Goal: Signs and Symptoms of Listed Potential Problems Will be Absent, Minimized or Managed (Ventilation, Mechanical Invasive)  Signs and symptoms of listed potential problems will be absent, minimized or managed by discharge/transition of care (reference Ventilation, Mechanical Invasive (Adult) CPG).   Outcome: Ongoing (interventions implemented as appropriate)  Patient received and remained on the mechanical ventilator on documented settings. No changes made at this time. Will continue to monitor.

## 2017-07-19 NOTE — NURSING
Pt alert and oriented at baseline per brother, Massimo. VSS. Pt extubated without difficulties this AM; pt currently on RA. Pt underwent HD this shift; 2 L removed. Left UE continues to be severely edematous. MD reordered yudith U/S of UE; currently being performed at bedside. Pt now on diabetic/renal/soft dental diet. Pt started on long-acting detemir.   Pt family at bedside.

## 2017-07-19 NOTE — CONSULTS
I have seen the patient, reviewed the Fellow's history and physical, assessment and plan. I have personally interviewed and examined the patient at bedside and: with the following modifications.      78 yo M with PMHx ESRD (MWF), HTN, IDDM who underwent AVF thrombectomy and stent placement for subclavian stenosis - procedure w/o complication but patient subsequently discharged to [facility] where 1hr into HD he was found to be increasingly somnolent and hypotensive with no response to fluid challenge. and placed on vasopressors. In ED hypoxemic, subsequently intubated with rapid improvement in oxygenation - hypotensive unresponsive to fluid challenge and placed on vasopressors. CTA PE Protocol demonstrated right-sided multifocal PE.  Normotensive this AM, arousable, tolerating PS 5. Afebrile but with leukocytosis.  Unclear sequence of events but possibility of obstructive shock d/t large clot which by time of imaging had showered out segmental/subsegmental distribution.  Per CTA stent appears patent and AVF with good thrill.  Normal echo without evidence of RV strain or LV WMA nor significant pericardial effusion. PLan for HD today.    7/19/17: HD performed yesterday - per RN patient remained somnolent and therefore left intubated.  Extubated this AM to RA - breathing/resting comfortably.  LUE venous/arterial US performed yesterday without significant abnormality noted except for possible mild/mod stenosis within subclavian stent.  LUE significantly more swollen today - AVF with strong thrill yesterday but now no thrill but remains pulsatile.  Per brother patient's mental status at baseline - still appears drowsy, not very communicative but does respond appropriately when questioned.     Hypoxemic Respiratory Failure d/t PE +/- aspiration pneumonitis  Shock, undifferentiated - obstructive +/- sepsis  Pulmonary embolism  Aspiration pneumonia  AGMA - Uremia + DKA  DKA  Balloon structure in distal esophagus on  imaging??  ESRD  IDDM  AMS - metabolic encephalopathy d/t uremia      Recommend:     - Repeat LUE US d/t concern for sudden/significant increase in LUE swelling while on AC - evaluate for hematoma  - Recheck beta-hydroxybutyrate, would resume home insulin at 2/3 home dose depending on patient's appetite today (passed bedside swallow)  - Heparin gtt for PE - watch arm size may need to hold heparin gtt for now  - Vancomycin/Cefepime for now - deescalate pending culture results tomorrow   - Procalcitonin elevated 4.5 favoring septic shock - PNA appears to be most likely source - repeat CXR today     Thank you for the consult.     >30 min Critical Care time spent reviewing history, imaging, labs and discussing plan of care with Primary team and brother at bedside.     Arianna Belle MD

## 2017-07-19 NOTE — PLAN OF CARE
Massimo Barrios (brother) [phone] or [phone]  -----------------------------    ATTN: TEAM LB PLANNING      PT ON VENT  Yesterday   - SPOKE WITH PT BROTHER FOR ASSESSMENT -  Massimo Barrios (brother) [phone] - PT LIVES WITH HIM - PT BLIND - INDEPENDENT PRIOR TO HOSPLIZATION     PRIOR HX: HHC( UNAWARE OF NAME HHC )  PCA FROM [facility] AGING # [phone] HAD PCA 3 / 4 YEARS AGO - BROTHER REQUEST SITTER IN HOME AGAIN FROM THE [facility]      BROTHER DECLINES NHOEME PLACEMENT - REQUEST [facility] AND  RESUME  SITTER SERVICES FROM [facility] ON AGING ?       RECOMMENDATIONS:   PT/OT EVAL ( IN HOUSE ) POSSIBLE PLACEMENT     Blanca Feliciano RN  Case management # [phone] (FAX) [phone]  --------------------------------------------     07/19/17 1650   Discharge Assessment   Assessment Type Discharge Planning Reassessment

## 2017-07-19 NOTE — PROGRESS NOTES
[facility] Hospital Medicine  Progress Note    Patient Name: Rahul Barrios  MRN: [id number]  Patient Class: IP- Inpatient   Admission Date: 7/17/2017  Length of Stay: 2 days  Attending Physician: Kenny Jansen MD  Primary Care Provider: Herb Maldonado MD        Subjective:     Principal Problem:Pulmonary embolus    HPI:  See admission history and physical exam note by Dr. Cordell Heredia.    Hospital Course:  Patient admitted to the intensive care unit and started in heparin infusion to treat acute pulmonary embolus.  Patient's blood pressure improved after he was intubated and never required vasopressor support.  Patient's mental status improved with dialysis.  Patient successful extubated this morning on 7/19/2017.    Past Medical History:   Diagnosis Date    Abnormal blood electrolyte level 06/28/2016    Anemia in chr kidney dis     Dependence on renal dialysis 06/03/2014    Diabetes mellitus     Hyperlipidemia     Hypertension     Infection and inflammatory reaction due to cardiac device, implant, and graft 10/26/2011    Secondary hyperparathyroidism of renal origin 08/15/2011    Unspecified protein-calorie malnutrition 06/18/2013    Vitamin D deficiency 06/28/2016       Past Surgical History:   Procedure Laterality Date    VASCULAR SURGERY         Review of patient's allergies indicates:  No Known Allergies    Current Neurological Medications: None    No current facility-administered medications on file prior to encounter.      Current Outpatient Prescriptions on File Prior to Encounter   Medication Sig    cinacalcet (SENSIPAR) 30 MG Tab Take 30 mg by mouth before dinner.    losartan (COZAAR) 50 MG tablet Take 50 mg by mouth once daily.    metoprolol succinate (TOPROL-XL) 50 MG 24 hr tablet Take 25 mg by mouth 2 (two) times daily.    simvastatin (ZOCOR) 40 MG tablet Take 40 mg by mouth every evening.    castor oil liquid Take by mouth every Tues, Thurs, Sat.    insulin aspart  protamine-insulin aspart (NOVOLOG 70/30) 100 unit/mL (70-30) InPn pen Inject into the skin 2 (two) times daily before meals.    sevelamer carbonate (RENVELA) 800 mg Tab Take 800 mg by mouth 3 (three) times daily with meals.    vitamin renal formula, B-complex-vitamin c-folic acid, (NEPHROCAPS) 1 mg Cap Take 1 capsule by mouth once daily.     Family History     None        Social History Main Topics    Smoking status: Never Smoker    Smokeless tobacco: Never Used    Alcohol use No    Drug use: No    Sexual activity: No     Review of Systems   Constitutional: Negative for chills and fever.   Respiratory: Negative for shortness of breath.    Cardiovascular: Negative for chest pain.   Gastrointestinal: Negative for abdominal pain, constipation, diarrhea, nausea and vomiting.     Objective:     Vital Signs (Most Recent):  Temp: 98.4 °F (36.9 °C) (07/19/17 1515)  Pulse: 82 (07/19/17 1600)  Resp: 17 (07/19/17 1600)  BP: (!) 118/56 (07/19/17 1600)  SpO2: 95 % (07/19/17 1600) Vital Signs (24h Range):  Temp:  [98.4 °F (36.9 °C)-99 °F (37.2 °C)] 98.4 °F (36.9 °C)  Pulse:  [] 82  Resp:  [11-24] 17  SpO2:  [94 %-100 %] 95 %  BP: (102-155)/(46-69) 118/56     Weight: 91.1 kg (200 lb 13.4 oz) (previous was 92kg)  Body mass index is 29.66 kg/m².    Physical Exam   Constitutional: He appears well-developed and well-nourished. No distress.   HENT:   Head: Normocephalic and atraumatic.   Nose: Nose normal.   Mouth/Throat: Oropharynx is clear and moist. No oropharyngeal exudate.   Eyes: Conjunctivae are normal. Right eye exhibits no discharge. Left eye exhibits no discharge. No scleral icterus.   Neck: Normal range of motion. Neck supple. No JVD present. No tracheal deviation present. No thyromegaly present.   Cardiovascular: Normal rate, regular rhythm, normal heart sounds and intact distal pulses.  Exam reveals no gallop and no friction rub.    No murmur heard.  Pulmonary/Chest: Effort normal and breath sounds normal. No  stridor. No respiratory distress. He has no wheezes. He has no rales. He exhibits no tenderness.   Abdominal: Soft. Bowel sounds are normal. He exhibits no distension and no mass. There is no tenderness. There is no rebound and no guarding.   Musculoskeletal: Normal range of motion. He exhibits edema. He exhibits no tenderness.   Left arm swelling with good thrill.   Lymphadenopathy:     He has no cervical adenopathy.   Neurological: He is alert. He has normal reflexes. He displays normal reflexes. No cranial nerve deficit. He exhibits normal muscle tone. Coordination normal.   Slow to answer questions but answering appropriately this morning.   Skin: Skin is warm and dry. No rash noted. He is not diaphoretic. No erythema. No pallor.   Psychiatric: He has a normal mood and affect. His behavior is normal. Judgment and thought content normal.            Significant Labs: All pertinent lab results from the past 24 hours have been reviewed.    Significant Imaging: I have reviewed all pertinent imaging results/findings within the past 24 hours.    Assessment/Plan:      * Pulmonary embolus    Echocardiogram does not show evidence of right-sided heart strain.  Continue with anticoagulation with heparin infusion.        Acute respiratory failure with hypoxia    Secondary to acute pulmonary embolus and also likely aspiration pneumonia.  Continue with antibiotic treatment as with elevated procalcitonin.  Status post extubated this morning doing well thus far.          Septic shock    Secondary to aspiration pneumonia.  Improving with broad-spectrum antibiotic treatment.  Will continue pending culture results.          Diabetic ketoacidosis without coma associated with type 2 diabetes mellitus    Hypervolemic on exam and anuric and therefore further fluids not indicated at this time.  Improving with insulin.  Will continue.        Anemia in chronic kidney disease              ESRD on dialysis    Continue with dialysis.           Metabolic acidosis    Combination of renal failure along with degree of diabetic ketoacidosis and starvation ketosis.  Improving with dialysis and insulin,.          Type 2 diabetes mellitus with complication, with long-term current use of insulin                 VTE Risk Mitigation         Ordered     Medium Risk of VTE  Once      07/18/17 0037          Kenny Jansen MD  Department of Hospital Medicine   [facility]

## 2017-07-19 NOTE — ASSESSMENT & PLAN NOTE
Echocardiogram does not show evidence of right-sided heart strain.  Continue with anticoagulation with heparin infusion.

## 2017-07-19 NOTE — PROCEDURES
DATE OF PROCEDURE:  07/19/2017    Inpatient study was done at [facility].    DURATION:  26 minutes 47 seconds.    ELECTROENCEPHALOGRAM REPORT    METHODOLOGY:  Electroencephalographic (EEG) recording is recorded with   electrodes placed according to the International 10-20 placement system.  Thirty   two (32) channels of digital signal (sampling rate of 512/sec), including T1   and T2, were simultaneously recorded from the scalp and may include EKG, EMG,   and/or eye monitors.  Recording band pass was 0.1 to 512 Hz.  Digital video   recording of the patient is simultaneously recorded with the EEG.  The patient   is instructed to report clinical symptoms which may occur during the recording   session.  EEG and video recording are stored and archived in digital format.    Activation procedures, which include photic stimulation, hyperventilation and   instructing patients to perform simple tasks, are done in selected patients.    The EEG is displayed on a monitor screen and can be reviewed using different   montages.  Computer assisted-analysis is employed to detect spike and   electrographic seizure activity.  The entire record is submitted for computer   analysis.  The entire recording is visually reviewed, and the times identified   by computer analysis as being spikes or seizures are reviewed again.    Compressed spectral analysis (CSA) is also performed on the activity recorded   from each individual channel.  This is displayed as a power display of   frequencies from 0 to 30 Hz over time.  The CSA is reviewed looking for   asymmetries in power between homologous areas of the scalp, then compared with   the original EEG recording.    [employer] software was also utilized in the review of this study.  This software   suite analyzes the EEG recording in multiple domains.  Coherence and rhythmicity   are computed to identify EEG sections which may contain organized seizures.    Each channel undergoes analysis to  detect the presence of spike and sharp waves   which have special and morphological characteristics of epileptic activity.  The   routine EEG recording is converted from special into frequency domain.  This is   then displayed comparing homologous areas to identify areas of significant   asymmetry.  Algorithm to identify non-cortically generated artifact is used to   separate artifact from the EEG.    EEG FINDINGS:  This is a mixed frequency background, which contains diffuse   alpha and beta activity with a fair amount of admixed theta frequencies in the 5   to 7 Hz range as well.  There is no posterior dominant rhythm.  There is good   variability.  There is central beta activity, which is prominent throughout the   record and at times becomes more prominent, suggesting drowsiness and possibly   light sleep.  EMG artifact dominates at times.  There is no major asymmetry.    There are no epileptiform discharges.  There are no seizures.    In the accompanying video, an intubated patient with the head of the bed   slightly elevated is seen, who appears to be slightly agitated at times.  No   clinical events are noted.    INTERPRETATION:  Abnormal EEG due to moderate diffuse fluctuating encephalopathy   with some elements of normal sleep architecture seen.  There were no focal   findings or evidence of seizures.  The appearance of sleep architecture suggests   a lighter degree of encephalopathy.      NBB/SN  dd: 07/19/2017 09:59:44 (CDT)  td: 07/19/2017 10:45:27 (CDT)  Doc ID   #[id number]  Job ID #[id number]    CC:

## 2017-07-19 NOTE — SUBJECTIVE & OBJECTIVE
Past Medical History:   Diagnosis Date    Abnormal blood electrolyte level 06/28/2016    Anemia in chr kidney dis     Dependence on renal dialysis 06/03/2014    Diabetes mellitus     Hyperlipidemia     Hypertension     Infection and inflammatory reaction due to cardiac device, implant, and graft 10/26/2011    Secondary hyperparathyroidism of renal origin 08/15/2011    Unspecified protein-calorie malnutrition 06/18/2013    Vitamin D deficiency 06/28/2016       Past Surgical History:   Procedure Laterality Date    VASCULAR SURGERY         Review of patient's allergies indicates:  No Known Allergies    Current Neurological Medications: None    No current facility-administered medications on file prior to encounter.      Current Outpatient Prescriptions on File Prior to Encounter   Medication Sig    cinacalcet (SENSIPAR) 30 MG Tab Take 30 mg by mouth before dinner.    losartan (COZAAR) 50 MG tablet Take 50 mg by mouth once daily.    metoprolol succinate (TOPROL-XL) 50 MG 24 hr tablet Take 25 mg by mouth 2 (two) times daily.    simvastatin (ZOCOR) 40 MG tablet Take 40 mg by mouth every evening.    castor oil liquid Take by mouth every Tues, Thurs, Sat.    insulin aspart protamine-insulin aspart (NOVOLOG 70/30) 100 unit/mL (70-30) InPn pen Inject into the skin 2 (two) times daily before meals.    sevelamer carbonate (RENVELA) 800 mg Tab Take 800 mg by mouth 3 (three) times daily with meals.    vitamin renal formula, B-complex-vitamin c-folic acid, (NEPHROCAPS) 1 mg Cap Take 1 capsule by mouth once daily.     Family History     None        Social History Main Topics    Smoking status: Never Smoker    Smokeless tobacco: Never Used    Alcohol use No    Drug use: No    Sexual activity: No     Review of Systems   Constitutional: Negative for chills and fever.   Respiratory: Negative for shortness of breath.    Cardiovascular: Negative for chest pain.   Gastrointestinal: Negative for abdominal pain,  constipation, diarrhea, nausea and vomiting.     Objective:     Vital Signs (Most Recent):  Temp: 98.4 °F (36.9 °C) (07/19/17 1515)  Pulse: 82 (07/19/17 1600)  Resp: 17 (07/19/17 1600)  BP: (!) 118/56 (07/19/17 1600)  SpO2: 95 % (07/19/17 1600) Vital Signs (24h Range):  Temp:  [98.4 °F (36.9 °C)-99 °F (37.2 °C)] 98.4 °F (36.9 °C)  Pulse:  [] 82  Resp:  [11-24] 17  SpO2:  [94 %-100 %] 95 %  BP: (102-155)/(46-69) 118/56     Weight: 91.1 kg (200 lb 13.4 oz) (previous was 92kg)  Body mass index is 29.66 kg/m².    Physical Exam   Constitutional: He appears well-developed and well-nourished. No distress.   HENT:   Head: Normocephalic and atraumatic.   Nose: Nose normal.   Mouth/Throat: Oropharynx is clear and moist. No oropharyngeal exudate.   Eyes: Conjunctivae are normal. Right eye exhibits no discharge. Left eye exhibits no discharge. No scleral icterus.   Neck: Normal range of motion. Neck supple. No JVD present. No tracheal deviation present. No thyromegaly present.   Cardiovascular: Normal rate, regular rhythm, normal heart sounds and intact distal pulses.  Exam reveals no gallop and no friction rub.    No murmur heard.  Pulmonary/Chest: Effort normal and breath sounds normal. No stridor. No respiratory distress. He has no wheezes. He has no rales. He exhibits no tenderness.   Abdominal: Soft. Bowel sounds are normal. He exhibits no distension and no mass. There is no tenderness. There is no rebound and no guarding.   Musculoskeletal: Normal range of motion. He exhibits edema. He exhibits no tenderness.   Left arm swelling with good thrill.   Lymphadenopathy:     He has no cervical adenopathy.   Neurological: He is alert. He has normal reflexes. He displays normal reflexes. No cranial nerve deficit. He exhibits normal muscle tone. Coordination normal.   Slow to answer questions but answering appropriately this morning.   Skin: Skin is warm and dry. No rash noted. He is not diaphoretic. No erythema. No pallor.    Psychiatric: He has a normal mood and affect. His behavior is normal. Judgment and thought content normal.            Significant Labs: All pertinent lab results from the past 24 hours have been reviewed.    Significant Imaging: I have reviewed all pertinent imaging results/findings within the past 24 hours.

## 2017-07-19 NOTE — PROGRESS NOTES
Pulmonary / Critical Care Medicine  Progress Note  S: No major issues overnight.  Extubated this morning without any issues.  Doing well otherwise, without complaint.       O: VS: Temp:      [98.4-99]   Pulse:      []   Resp:      [11-24]   BP:          (102-155)/(46-69)   SpO2:      [%]     I/O:   Intake 75.4 ml   Output 2500 ml   Net 2424.6 ml       Vent: Extubated this morning.  Now on room air.    PE well developed, well nourished, no distress, moderately obese  lips, mucosa, and tongue normal; teeth and gums normal and no throat erythema  conjunctivae/corneas clear. PERRL., pupils responsive  regular rate and rhythm, S1, S2 normal, no murmur  clear to auscultation bilaterally, normal respiratory effort and normal percussion bilaterally  soft, non-tender non-distended; bowel sounds normal  warm, well perfused and no cyanosis or edema, or clubbing; unable to palpate AVF thrill; left arm much more edematous than yesterday    Lines Central line, Day# 2  IV, Day# 2    Labs   Lab 07/19/17  0200 07/19/17  0537 07/19/17  1016   WBC 12.07     RBC 2.66*     HGB 8.6*     HCT 25.1*       171     MCV 94     MCH 32.3*     MCHC 34.3     NA  140    K  4.1    CL  100    CO2  20*    BUN  71*    CREATININE  11.3*    MG  1.6    INR 0.9     APTT 98.9*  58.1*       Imaging CXR 07/19/2017 I personally reviewed the films and findings are:, unremarkable.  Improved right basilar opacification       Micro Blood Cx 7/18 7/18 NGTD  NGTD       Medications Scheduled    aspirin  81 mg Daily    insulin detemir  5 Units QHS    piperacillin-tazobactam  4.5 g Q12H    sevelamer carbonate  800 mg TID WM    simvastatin  40 mg QHS      Continuous Infusions:    heparin 9 Units/kg/hr      PRN   sodium chloride 0.9%, sodium chloride 0.9%, dextrose 50%, dextrose 50%, dextrose 50%, dextrose 50%, glucagon (human recombinant), glucagon (human recombinant), glucose, glucose, glucose, glucose, heparin (PORCINE), heparin (PORCINE),  insulin aspart, insulin aspart, naloxone      A/P:  · Neuro:  · Metabolic encephalopathy:  · Improving with adequate HD and resolution of uremia.  · CVS:  · Undifferentiated Shock (resolved)  · Etiology remains elusive.  · HDS since initial presentation.  · Pulmonary:  · Acute hypoxemic respiratory failure (resolved)  · Pulmonary embolus:  · Hypoxemia improved.  · Continue heparin GTT for now.  Will probably need warfarin.  · FEN/GI:  · AGMA:  · Hyperkalemia:  · AGMA improved with HD.   · Beta-OH-butyrate elevated.  Started long acting and sliding scale insulin.  · Cautious use of insulin in pt with ESRD and minimal oral intake.  · Renal:  · ESRD  · Tolerated HD well today for 3 hours.  · Fistula functioning.  Intermittent thrill.  Will obtain U/S for the sake of thoroughness.  · Heme/ID:  · Concern for occult infection:  · AOCD  · No indications for blood products at this time.  · No positive culture data at the moment.  · Currently receiving emperic broad spectrum antibiotics.  Will de-escalate tomorrow if no growth.  · Endocrine:  · Type 2 DM:  · Secondary hyperparathyroidism:  · Restarted basal and sliding scale insulin  · PPx/Dispo:  · Continue heparin GTT.  · Continue to monitor in the ICU for now.    Edmund Sandoval MD  [employer] Pulmonary / Critical Care Fellow  [phone]  07/19/2017  5:28 PM

## 2017-07-20 LAB
ANION GAP SERPL CALC-SCNC: 21 MMOL/L
APTT BLDCRRT: 49 SEC
BUN SERPL-MCNC: 84 MG/DL
CALCIUM SERPL-MCNC: 8.3 MG/DL
CHLORIDE SERPL-SCNC: 98 MMOL/L
CO2 SERPL-SCNC: 20 MMOL/L
CREAT SERPL-MCNC: 13 MG/DL
EST. GFR  (AFRICAN AMERICAN): 4 ML/MIN/1.73 M^2
EST. GFR  (NON AFRICAN AMERICAN): 3 ML/MIN/1.73 M^2
GLUCOSE SERPL-MCNC: 153 MG/DL
MAGNESIUM SERPL-MCNC: 1.8 MG/DL
PHOSPHATE SERPL-MCNC: 6.1 MG/DL
POCT GLUCOSE: 145 MG/DL (ref 70–110)
POCT GLUCOSE: 159 MG/DL (ref 70–110)
POCT GLUCOSE: 163 MG/DL (ref 70–110)
POCT GLUCOSE: 172 MG/DL (ref 70–110)
POCT GLUCOSE: 187 MG/DL (ref 70–110)
POCT GLUCOSE: 234 MG/DL (ref 70–110)
POTASSIUM SERPL-SCNC: 4.2 MMOL/L
SODIUM SERPL-SCNC: 139 MMOL/L
VANCOMYCIN SERPL-MCNC: 19 UG/ML

## 2017-07-20 PROCEDURE — 25000003 PHARM REV CODE 250: Performed by: INTERNAL MEDICINE

## 2017-07-20 PROCEDURE — 80100016 HC MAINTENANCE HEMODIALYSIS

## 2017-07-20 PROCEDURE — 80202 ASSAY OF VANCOMYCIN: CPT

## 2017-07-20 PROCEDURE — 63600175 PHARM REV CODE 636 W HCPCS: Performed by: INTERNAL MEDICINE

## 2017-07-20 PROCEDURE — 80048 BASIC METABOLIC PNL TOTAL CA: CPT

## 2017-07-20 PROCEDURE — 99233 SBSQ HOSP IP/OBS HIGH 50: CPT | Mod: ,,, | Performed by: HOSPITALIST

## 2017-07-20 PROCEDURE — 25000003 PHARM REV CODE 250: Performed by: HOSPITALIST

## 2017-07-20 PROCEDURE — 83735 ASSAY OF MAGNESIUM: CPT

## 2017-07-20 PROCEDURE — 84100 ASSAY OF PHOSPHORUS: CPT

## 2017-07-20 PROCEDURE — 20000000 HC ICU ROOM

## 2017-07-20 PROCEDURE — 85730 THROMBOPLASTIN TIME PARTIAL: CPT

## 2017-07-20 RX ORDER — SODIUM CHLORIDE 9 MG/ML
INJECTION, SOLUTION INTRAVENOUS
Status: DISCONTINUED | OUTPATIENT
Start: 2017-07-20 | End: 2017-07-23 | Stop reason: HOSPADM

## 2017-07-20 RX ORDER — SODIUM CHLORIDE 9 MG/ML
INJECTION, SOLUTION INTRAVENOUS ONCE
Status: DISCONTINUED | OUTPATIENT
Start: 2017-07-20 | End: 2017-07-21

## 2017-07-20 RX ORDER — FAMOTIDINE 10 MG/ML
20 INJECTION INTRAVENOUS 2 TIMES DAILY
Status: DISCONTINUED | OUTPATIENT
Start: 2017-07-20 | End: 2017-07-23 | Stop reason: HOSPADM

## 2017-07-20 RX ADMIN — ERYTHROPOIETIN 10000 UNITS: 10000 INJECTION, SOLUTION INTRAVENOUS; SUBCUTANEOUS at 06:07

## 2017-07-20 RX ADMIN — SIMVASTATIN 40 MG: 40 TABLET, FILM COATED ORAL at 09:07

## 2017-07-20 RX ADMIN — PIPERACILLIN SODIUM AND TAZOBACTAM SODIUM 4.5 G: 4; .5 INJECTION, POWDER, LYOPHILIZED, FOR SOLUTION INTRAVENOUS at 12:07

## 2017-07-20 RX ADMIN — SEVELAMER CARBONATE 800 MG: 800 TABLET, FILM COATED ORAL at 05:07

## 2017-07-20 RX ADMIN — ASPIRIN 81 MG: 81 TABLET, COATED ORAL at 08:07

## 2017-07-20 RX ADMIN — PIPERACILLIN SODIUM AND TAZOBACTAM SODIUM 4.5 G: 4; .5 INJECTION, POWDER, LYOPHILIZED, FOR SOLUTION INTRAVENOUS at 11:07

## 2017-07-20 RX ADMIN — SEVELAMER CARBONATE 800 MG: 800 TABLET, FILM COATED ORAL at 07:07

## 2017-07-20 RX ADMIN — INSULIN ASPART 1 UNITS: 100 INJECTION, SOLUTION INTRAVENOUS; SUBCUTANEOUS at 12:07

## 2017-07-20 RX ADMIN — SEVELAMER CARBONATE 800 MG: 800 TABLET, FILM COATED ORAL at 12:07

## 2017-07-20 RX ADMIN — FAMOTIDINE 20 MG: 10 INJECTION, SOLUTION INTRAVENOUS at 09:07

## 2017-07-20 RX ADMIN — INSULIN ASPART 2 UNITS: 100 INJECTION, SOLUTION INTRAVENOUS; SUBCUTANEOUS at 05:07

## 2017-07-20 RX ADMIN — INSULIN DETEMIR 5 UNITS: 100 INJECTION, SOLUTION SUBCUTANEOUS at 09:07

## 2017-07-20 RX ADMIN — INSULIN ASPART 2 UNITS: 100 INJECTION, SOLUTION INTRAVENOUS; SUBCUTANEOUS at 12:07

## 2017-07-20 RX ADMIN — HEPARIN SODIUM AND DEXTROSE 9 UNITS/KG/HR: 10000; 5 INJECTION INTRAVENOUS at 10:07

## 2017-07-20 NOTE — ASSESSMENT & PLAN NOTE
Hypervolemic on exam and anuric and therefore further fluids not indicated at this time.  Improving with insulin.  Will continue.  Increased anion gap at this time mostly due to inadequate dialysis likely due to issues with his current access.  Will discuss with nephrology.

## 2017-07-20 NOTE — ASSESSMENT & PLAN NOTE
Plan to continue with dialysis.  Either attempt from dialysis via current left upper extremity access verus placement of alterative access.

## 2017-07-20 NOTE — PROGRESS NOTES
NAD noted. VSS stable Spo2 remains >95% on room air. Denies any resp distress or Chest pain. Heparin infusing as ordered.

## 2017-07-20 NOTE — PROGRESS NOTES
Progress Note  Nephrology      Consult Requested By: Kenny Jansen MD      SUBJECTIVE:     Overnight events  Patient is a 77 y.o. male presents with hypoxia, hypotension, PE.    No overnight events.  Patient extubated yesterday.  He underwent successful UF treatment with about 2L fluid removal.  Awake and alert today, sitting in bed.  Without complaints.  No chest pain or SOB.      Patient Active Problem List   Diagnosis    Mechanical complication of other vascular device, implant, and graft    Failure of hemodialysis access    Anemia in chronic kidney disease    Metabolic acidosis    Type 2 diabetes mellitus with complication, with long-term current use of insulin    ESRD on dialysis    Pulmonary embolus    Anemia due to pre-ESRD treated with erythropoietin    Acute respiratory failure with hypoxia    Diabetic ketoacidosis without coma associated with type 2 diabetes mellitus    Septic shock     Past Medical History:   Diagnosis Date    Abnormal blood electrolyte level 06/28/2016    Anemia in chr kidney dis     Dependence on renal dialysis 06/03/2014    Diabetes mellitus     Hyperlipidemia     Hypertension     Infection and inflammatory reaction due to cardiac device, implant, and graft 10/26/2011    Secondary hyperparathyroidism of renal origin 08/15/2011    Unspecified protein-calorie malnutrition 06/18/2013    Vitamin D deficiency 06/28/2016              OBJECTIVE:     Vitals:    07/20/17 0830 07/20/17 0900 07/20/17 0930 07/20/17 1000   BP: 131/61 135/62 128/62 (!) 119/56   BP Location:       Patient Position:       BP Method:       Pulse: 78 78 74 82   Resp: (!) 23 19 14 19   Temp:       TempSrc:       SpO2: (!) 93% 95% (!) 93% (!) 93%   Weight:       Height:           Temp: 98.7 °F (37.1 °C) (07/20/17 0715)  Pulse: 82 (07/20/17 1000)  Resp: 19 (07/20/17 1000)  BP: (!) 119/56 (07/20/17 1000)  SpO2: (!) 93 % (07/20/17 1000)               Medications:   sodium chloride 0.9%   Intravenous  Once    sodium chloride 0.9%   Intravenous Once    sodium chloride 0.9%   Intravenous Once    aspirin  81 mg Oral Daily    epoetin radha (PROCRIT) injection  10,000 Units Intravenous Once    insulin detemir  5 Units Subcutaneous QHS    piperacillin-tazobactam 4.5 g in dextrose 5 % 100 mL IVPB (ready to mix system)  4.5 g Intravenous Q12H    sevelamer carbonate  800 mg Oral TID WM    simvastatin  40 mg Oral QHS      heparin (porcine) in D5W 9 Units/kg/hr (07/20/17 1049)               Physical Exam:  General appearance: NAD, sitting awake in bed.  Patient is blind  CV: RRR  Abd: soft, NT  Ext: 1+ edema. LUE swelling has decreased since yesterday.  There is a soft thrill in the LUE AVG.       Laboratory:  Labs reviewed  Recent Results (from the past 336 hour(s))   Basic metabolic panel    Collection Time: 07/20/17  4:15 AM   Result Value Ref Range    Sodium 139 136 - 145 mmol/L    Potassium 4.2 3.5 - 5.1 mmol/L    Chloride 98 95 - 110 mmol/L    CO2 20 (L) 23 - 29 mmol/L    BUN, Bld 84 (H) 8 - 23 mg/dL    Creatinine 13.0 (H) 0.5 - 1.4 mg/dL    Calcium 8.3 (L) 8.7 - 10.5 mg/dL    Anion Gap 21 (H) 8 - 16 mmol/L   Basic metabolic panel    Collection Time: 07/19/17  5:37 AM   Result Value Ref Range    Sodium 140 136 - 145 mmol/L    Potassium 4.1 3.5 - 5.1 mmol/L    Chloride 100 95 - 110 mmol/L    CO2 20 (L) 23 - 29 mmol/L    BUN, Bld 71 (H) 8 - 23 mg/dL    Creatinine 11.3 (H) 0.5 - 1.4 mg/dL    Calcium 7.8 (L) 8.7 - 10.5 mg/dL    Anion Gap 20 (H) 8 - 16 mmol/L     Recent Results (from the past 336 hour(s))   CBC auto differential    Collection Time: 07/19/17  2:00 AM   Result Value Ref Range    WBC 12.07 3.90 - 12.70 K/uL    Hemoglobin 8.6 (L) 14.0 - 18.0 g/dL    Hematocrit 25.1 (L) 40.0 - 54.0 %    Platelets 171 150 - 350 K/uL   CBC auto differential    Collection Time: 07/18/17  6:30 AM   Result Value Ref Range    WBC 15.89 (H) 3.90 - 12.70 K/uL    Hemoglobin 9.0 (L) 14.0 - 18.0 g/dL    Hematocrit 27.1 (L) 40.0 -  54.0 %    Platelets 195 150 - 350 K/uL   CBC auto differential    Collection Time: 07/18/17  1:45 AM   Result Value Ref Range    WBC 15.55 (H) 3.90 - 12.70 K/uL    Hemoglobin 9.2 (L) 14.0 - 18.0 g/dL    Hematocrit 27.5 (L) 40.0 - 54.0 %    Platelets 199 150 - 350 K/uL       Diagnostic Results:    US:  FINDINGS:  There is a left-sided brachiocephalic fistula present.  There is an area of increased velocity involving the proximal aspect of the graft measuring up to 296 cm/s.  There is nonocclusive thrombi within the proximal aspect of the graft.  There is also a vascular outpouching within the mid aspect of the graft with to and flow vascularity.  Nonocclusive thrombi is also noted in the distal aspects of the graft.     There is sluggish flow within the left internal jugular vein.  There is suggestion of intraluminal thrombi along the walls of the left internal jugular vein.There is also a stent within the left subclavian vein.  The stent is patent.     The left axillary, basilic, and brachial veins are patent.   Impression       1. Multi-focal nonocclusive thrombi along the left of the brachiocephalic fistula.  Small vascular outpouching in the mid aspect of the graft with to and flow vascularity, suggestive of a small pseudoaneurysm.    2.  Elevated velocity (296 cm/s) in the proximal aspect of the brachiocephalic graft, suggestive of early high-grade stenoses.    3. Sluggish flow within the left internal jugular vein, suggestive of nonocclusive thrombi.    4.  Patent left subclavian vein.    5.  Additional findings as above.           ASSESSMENT/PLAN:     Patient Active Problem List   Diagnosis    Mechanical complication of other vascular device, implant, and graft    Failure of hemodialysis access    Anemia in chronic kidney disease    Metabolic acidosis    Type 2 diabetes mellitus with complication, with long-term current use of insulin    ESRD on dialysis    Pulmonary embolus    Anemia due to pre-ESRD  treated with erythropoietin    Acute respiratory failure with hypoxia    Diabetic ketoacidosis without coma associated with type 2 diabetes mellitus    Septic shock       Plan:   1. ESRD:  HD MWF at [facility] with Dr. Maldonado  - will do HD x 3 hours today  -   - 2K, 2.5Ca     2. Mechanical complication AVF: s/p declot of left arm AVF with central stent placement.  Left arm swelling seems to have improved.  US shows a bit of non occulsive thrombus but much of this likely related to edema/external compression.  Good thrill in access and able to get about 350 mL/min on HD yesterday.  Will use the AVF for now.  Will need to monitor closely for changes.      3. Hypoxia/altered mental status: unclear etiology- possibly from PE, less likely sedation.   - improving  - on abx for possible PNA     4. HTN: stable     5. Anemia ESRD: will give EPO today.     Will cont to follow with you.     Swati Hennessy MD  [facility]

## 2017-07-20 NOTE — PROGRESS NOTES
Messages left to numbers listed to  and Dr. Woods (Nephrology) currently waiting for return call.

## 2017-07-20 NOTE — PLAN OF CARE
Problem: Patient Care Overview  Goal: Plan of Care Review  Outcome: Ongoing (interventions implemented as appropriate)  Received pt on RA with adequate saturations in no reported distress.

## 2017-07-20 NOTE — NURSING
Pt stated incorrect birth date. Pt very annoyed and uncooperative; unlike yesterday. Pt does; however, move all extremities to command. MD notified of possible neuro changes. No new orders.

## 2017-07-20 NOTE — PROGRESS NOTES
Call returned from Dr. Woods. Provider Notified  of LUE (fistula) multiple clots again per radiology report. Pt status update also provided. (pt extubated this morning, VSS HR 60-70s, SBP 120S, RA SPO2 >95% RR unlabored 10-20 breaths per min). No new orders given. MD plans to follow up with Nephrology partner in am. Will continue to monitor.

## 2017-07-20 NOTE — PROGRESS NOTES
[facility] Hospital Medicine  Progress Note    Patient Name: Rahul Barrios  MRN: [id number]  Patient Class: IP- Inpatient   Admission Date: 7/17/2017  Length of Stay: 3 days  Attending Physician: Kenny Jansen MD  Primary Care Provider: Herb Maldonado MD        Subjective:     Principal Problem:Pulmonary embolus    HPI:  See admission history and physical exam note by Dr. Cordell Heredia.    Hospital Course:  Patient admitted to the intensive care unit and started in heparin infusion to treat acute pulmonary embolus.  Patient's blood pressure improved after he was intubated and never required vasopressor support.  Patient's mental status improved with dialysis.  Patient successful extubated this morning on 7/19/2017.    Past Medical History:   Diagnosis Date    Abnormal blood electrolyte level 06/28/2016    Anemia in chr kidney dis     Dependence on renal dialysis 06/03/2014    Diabetes mellitus     Hyperlipidemia     Hypertension     Infection and inflammatory reaction due to cardiac device, implant, and graft 10/26/2011    Secondary hyperparathyroidism of renal origin 08/15/2011    Unspecified protein-calorie malnutrition 06/18/2013    Vitamin D deficiency 06/28/2016       Past Surgical History:   Procedure Laterality Date    VASCULAR SURGERY         Review of patient's allergies indicates:  No Known Allergies    Current Neurological Medications: None    No current facility-administered medications on file prior to encounter.      Current Outpatient Prescriptions on File Prior to Encounter   Medication Sig    cinacalcet (SENSIPAR) 30 MG Tab Take 30 mg by mouth before dinner.    losartan (COZAAR) 50 MG tablet Take 50 mg by mouth once daily.    metoprolol succinate (TOPROL-XL) 50 MG 24 hr tablet Take 25 mg by mouth 2 (two) times daily.    simvastatin (ZOCOR) 40 MG tablet Take 40 mg by mouth every evening.    castor oil liquid Take by mouth every Tues, Thurs, Sat.    insulin aspart  protamine-insulin aspart (NOVOLOG 70/30) 100 unit/mL (70-30) InPn pen Inject into the skin 2 (two) times daily before meals.    sevelamer carbonate (RENVELA) 800 mg Tab Take 800 mg by mouth 3 (three) times daily with meals.    vitamin renal formula, B-complex-vitamin c-folic acid, (NEPHROCAPS) 1 mg Cap Take 1 capsule by mouth once daily.     Family History     None        Social History Main Topics    Smoking status: Never Smoker    Smokeless tobacco: Never Used    Alcohol use No    Drug use: No    Sexual activity: No     Review of Systems   Constitutional: Negative for chills and fever.   Respiratory: Negative for shortness of breath.    Cardiovascular: Negative for chest pain.   Gastrointestinal: Negative for abdominal pain, constipation, diarrhea, nausea and vomiting.     Objective:     Vital Signs (Most Recent):  Temp: 98.7 °F (37.1 °C) (07/20/17 0400)  Pulse: 68 (07/20/17 0715)  Resp: 14 (07/20/17 0715)  BP: (!) 114/56 (07/20/17 0700)  SpO2: (!) 92 % (07/20/17 0715) Vital Signs (24h Range):  Temp:  [98.4 °F (36.9 °C)-98.8 °F (37.1 °C)] 98.7 °F (37.1 °C)  Pulse:  [58-98] 68  Resp:  [8-22] 14  SpO2:  [91 %-100 %] 92 %  BP: ()/(46-65) 114/56     Weight: 92.1 kg (203 lb 0.7 oz)  Body mass index is 29.98 kg/m².    Physical Exam   Constitutional: He appears well-developed and well-nourished. No distress.   HENT:   Head: Normocephalic and atraumatic.   Nose: Nose normal.   Mouth/Throat: Oropharynx is clear and moist. No oropharyngeal exudate.   Eyes: Conjunctivae are normal. Right eye exhibits no discharge. Left eye exhibits no discharge. No scleral icterus.   Neck: Normal range of motion. Neck supple. No JVD present. No tracheal deviation present. No thyromegaly present.   Cardiovascular: Normal rate, regular rhythm, normal heart sounds and intact distal pulses.  Exam reveals no gallop and no friction rub.    No murmur heard.  Pulmonary/Chest: Effort normal and breath sounds normal. No stridor. No  respiratory distress. He has no wheezes. He has no rales. He exhibits no tenderness.   Abdominal: Soft. Bowel sounds are normal. He exhibits no distension and no mass. There is no tenderness. There is no rebound and no guarding.   Musculoskeletal: Normal range of motion. He exhibits edema. He exhibits no tenderness.   Left arm swelling with good thrill.   Lymphadenopathy:     He has no cervical adenopathy.   Neurological: He is alert. He has normal reflexes. He displays normal reflexes. No cranial nerve deficit. He exhibits normal muscle tone. Coordination normal.   Improving mental status.   Skin: Skin is warm and dry. No rash noted. He is not diaphoretic. No erythema. No pallor.   Psychiatric: He has a normal mood and affect. His behavior is normal. Judgment and thought content normal.            Significant Labs: All pertinent lab results from the past 24 hours have been reviewed.    Significant Imaging: I have reviewed all pertinent imaging results/findings within the past 24 hours.    Assessment/Plan:      * Pulmonary embolus    Echocardiogram does not show evidence of right-sided heart strain.  Continue with anticoagulation with heparin infusion.        Acute respiratory failure with hypoxia    Secondary to acute pulmonary embolus and also likely aspiration pneumonia.  Continue with antibiotic treatment as with elevated procalcitonin.  Status post extubated on 7/19/2017.  Patient doing well off ventilator.        Septic shock    Secondary to aspiration pneumonia.  Improving with broad-spectrum antibiotic treatment.  Will continue pending culture results.          Diabetic ketoacidosis without coma associated with type 2 diabetes mellitus    Hypervolemic on exam and anuric and therefore further fluids not indicated at this time.  Improving with insulin.  Will continue.  Increased anion gap at this time mostly due to inadequate dialysis likely due to issues with his current access.  Will discuss with  nephrology.        Anemia in chronic kidney disease              ESRD on dialysis    Plan to continue with dialysis.  Either attempt from dialysis via current left upper extremity access verus placement of alterative access.        Metabolic acidosis    I suspect mostly due to inadequate dialysis.  Will arrange for further dialysis.        Type 2 diabetes mellitus with complication, with long-term current use of insulin               Anemia due to pre-ESRD treated with erythropoietin                VTE Risk Mitigation         Ordered     Medium Risk of VTE  Once      07/18/17 0037          Kenny Jansen MD  Department of Hospital Medicine   [facility]

## 2017-07-20 NOTE — PROGRESS NOTES
Pt AAOX3, Oriented to time, VS Stable. LUE US recently completed. Radiologist () called to notify of multiple clots found in LUE (venous) see report.  Dr. Sandoval at  notified of LUE clots as well. Currently waiting on return call from Nephrology on call. Heparin drip continues to infuse as ordered. Will continue to monitor.

## 2017-07-20 NOTE — SUBJECTIVE & OBJECTIVE
Past Medical History:   Diagnosis Date    Abnormal blood electrolyte level 06/28/2016    Anemia in chr kidney dis     Dependence on renal dialysis 06/03/2014    Diabetes mellitus     Hyperlipidemia     Hypertension     Infection and inflammatory reaction due to cardiac device, implant, and graft 10/26/2011    Secondary hyperparathyroidism of renal origin 08/15/2011    Unspecified protein-calorie malnutrition 06/18/2013    Vitamin D deficiency 06/28/2016       Past Surgical History:   Procedure Laterality Date    VASCULAR SURGERY         Review of patient's allergies indicates:  No Known Allergies    Current Neurological Medications: None    No current facility-administered medications on file prior to encounter.      Current Outpatient Prescriptions on File Prior to Encounter   Medication Sig    cinacalcet (SENSIPAR) 30 MG Tab Take 30 mg by mouth before dinner.    losartan (COZAAR) 50 MG tablet Take 50 mg by mouth once daily.    metoprolol succinate (TOPROL-XL) 50 MG 24 hr tablet Take 25 mg by mouth 2 (two) times daily.    simvastatin (ZOCOR) 40 MG tablet Take 40 mg by mouth every evening.    castor oil liquid Take by mouth every Tues, Thurs, Sat.    insulin aspart protamine-insulin aspart (NOVOLOG 70/30) 100 unit/mL (70-30) InPn pen Inject into the skin 2 (two) times daily before meals.    sevelamer carbonate (RENVELA) 800 mg Tab Take 800 mg by mouth 3 (three) times daily with meals.    vitamin renal formula, B-complex-vitamin c-folic acid, (NEPHROCAPS) 1 mg Cap Take 1 capsule by mouth once daily.     Family History     None        Social History Main Topics    Smoking status: Never Smoker    Smokeless tobacco: Never Used    Alcohol use No    Drug use: No    Sexual activity: No     Review of Systems   Constitutional: Negative for chills and fever.   Respiratory: Negative for shortness of breath.    Cardiovascular: Negative for chest pain.   Gastrointestinal: Negative for abdominal pain,  constipation, diarrhea, nausea and vomiting.     Objective:     Vital Signs (Most Recent):  Temp: 98.7 °F (37.1 °C) (07/20/17 0400)  Pulse: 68 (07/20/17 0715)  Resp: 14 (07/20/17 0715)  BP: (!) 114/56 (07/20/17 0700)  SpO2: (!) 92 % (07/20/17 0715) Vital Signs (24h Range):  Temp:  [98.4 °F (36.9 °C)-98.8 °F (37.1 °C)] 98.7 °F (37.1 °C)  Pulse:  [58-98] 68  Resp:  [8-22] 14  SpO2:  [91 %-100 %] 92 %  BP: ()/(46-65) 114/56     Weight: 92.1 kg (203 lb 0.7 oz)  Body mass index is 29.98 kg/m².    Physical Exam   Constitutional: He appears well-developed and well-nourished. No distress.   HENT:   Head: Normocephalic and atraumatic.   Nose: Nose normal.   Mouth/Throat: Oropharynx is clear and moist. No oropharyngeal exudate.   Eyes: Conjunctivae are normal. Right eye exhibits no discharge. Left eye exhibits no discharge. No scleral icterus.   Neck: Normal range of motion. Neck supple. No JVD present. No tracheal deviation present. No thyromegaly present.   Cardiovascular: Normal rate, regular rhythm, normal heart sounds and intact distal pulses.  Exam reveals no gallop and no friction rub.    No murmur heard.  Pulmonary/Chest: Effort normal and breath sounds normal. No stridor. No respiratory distress. He has no wheezes. He has no rales. He exhibits no tenderness.   Abdominal: Soft. Bowel sounds are normal. He exhibits no distension and no mass. There is no tenderness. There is no rebound and no guarding.   Musculoskeletal: Normal range of motion. He exhibits edema. He exhibits no tenderness.   Left arm swelling with good thrill.   Lymphadenopathy:     He has no cervical adenopathy.   Neurological: He is alert. He has normal reflexes. He displays normal reflexes. No cranial nerve deficit. He exhibits normal muscle tone. Coordination normal.   Improving mental status.   Skin: Skin is warm and dry. No rash noted. He is not diaphoretic. No erythema. No pallor.   Psychiatric: He has a normal mood and affect. His behavior  is normal. Judgment and thought content normal.            Significant Labs: All pertinent lab results from the past 24 hours have been reviewed.    Significant Imaging: I have reviewed all pertinent imaging results/findings within the past 24 hours.

## 2017-07-20 NOTE — ASSESSMENT & PLAN NOTE
Secondary to acute pulmonary embolus and also likely aspiration pneumonia.  Continue with antibiotic treatment as with elevated procalcitonin.  Status post extubated on 7/19/2017.  Patient doing well off ventilator.

## 2017-07-21 PROBLEM — A41.9 SEPSIS DUE TO PNEUMONIA: Status: ACTIVE | Noted: 2017-07-21

## 2017-07-21 PROBLEM — J18.9 SEPSIS DUE TO PNEUMONIA: Status: ACTIVE | Noted: 2017-07-21

## 2017-07-21 LAB
ANION GAP SERPL CALC-SCNC: 21 MMOL/L
APTT BLDCRRT: 44.6 SEC
BASOPHILS # BLD AUTO: 0.02 K/UL
BASOPHILS NFR BLD: 0.2 %
BUN SERPL-MCNC: 44 MG/DL
CALCIUM SERPL-MCNC: 8.7 MG/DL
CHLORIDE SERPL-SCNC: 99 MMOL/L
CO2 SERPL-SCNC: 19 MMOL/L
CREAT SERPL-MCNC: 8 MG/DL
DIFFERENTIAL METHOD: ABNORMAL
EOSINOPHIL # BLD AUTO: 0.1 K/UL
EOSINOPHIL NFR BLD: 1.1 %
ERYTHROCYTE [DISTWIDTH] IN BLOOD BY AUTOMATED COUNT: 15 %
EST. GFR  (AFRICAN AMERICAN): 7 ML/MIN/1.73 M^2
EST. GFR  (NON AFRICAN AMERICAN): 6 ML/MIN/1.73 M^2
GLUCOSE SERPL-MCNC: 172 MG/DL
HCT VFR BLD AUTO: 24.5 %
HGB BLD-MCNC: 8.1 G/DL
LYMPHOCYTES # BLD AUTO: 1.5 K/UL
LYMPHOCYTES NFR BLD: 16.5 %
MAGNESIUM SERPL-MCNC: 1.8 MG/DL
MCH RBC QN AUTO: 32 PG
MCHC RBC AUTO-ENTMCNC: 33.1 G/DL
MCV RBC AUTO: 97 FL
MONOCYTES # BLD AUTO: 1 K/UL
MONOCYTES NFR BLD: 11.4 %
NEUTROPHILS # BLD AUTO: 6.4 K/UL
NEUTROPHILS NFR BLD: 70.5 %
PHOSPHATE SERPL-MCNC: 5.2 MG/DL
PLATELET # BLD AUTO: 180 K/UL
PMV BLD AUTO: 11 FL
POCT GLUCOSE: 161 MG/DL (ref 70–110)
POCT GLUCOSE: 209 MG/DL (ref 70–110)
POCT GLUCOSE: 216 MG/DL (ref 70–110)
POCT GLUCOSE: 242 MG/DL (ref 70–110)
POTASSIUM SERPL-SCNC: 4.3 MMOL/L
RBC # BLD AUTO: 2.53 M/UL
SODIUM SERPL-SCNC: 139 MMOL/L
VANCOMYCIN SERPL-MCNC: 14.6 UG/ML
WBC # BLD AUTO: 9.05 K/UL

## 2017-07-21 PROCEDURE — 85025 COMPLETE CBC W/AUTO DIFF WBC: CPT

## 2017-07-21 PROCEDURE — 84100 ASSAY OF PHOSPHORUS: CPT

## 2017-07-21 PROCEDURE — 97530 THERAPEUTIC ACTIVITIES: CPT

## 2017-07-21 PROCEDURE — 63600175 PHARM REV CODE 636 W HCPCS: Performed by: HOSPITALIST

## 2017-07-21 PROCEDURE — 25000003 PHARM REV CODE 250: Performed by: HOSPITALIST

## 2017-07-21 PROCEDURE — 80202 ASSAY OF VANCOMYCIN: CPT

## 2017-07-21 PROCEDURE — 92610 EVALUATE SWALLOWING FUNCTION: CPT

## 2017-07-21 PROCEDURE — 83735 ASSAY OF MAGNESIUM: CPT

## 2017-07-21 PROCEDURE — 99233 SBSQ HOSP IP/OBS HIGH 50: CPT | Mod: ,,, | Performed by: HOSPITALIST

## 2017-07-21 PROCEDURE — 97162 PT EVAL MOD COMPLEX 30 MIN: CPT

## 2017-07-21 PROCEDURE — 85730 THROMBOPLASTIN TIME PARTIAL: CPT

## 2017-07-21 PROCEDURE — 36415 COLL VENOUS BLD VENIPUNCTURE: CPT

## 2017-07-21 PROCEDURE — 20000000 HC ICU ROOM

## 2017-07-21 PROCEDURE — 25000003 PHARM REV CODE 250: Performed by: INTERNAL MEDICINE

## 2017-07-21 PROCEDURE — 80048 BASIC METABOLIC PNL TOTAL CA: CPT

## 2017-07-21 RX ORDER — INSULIN ASPART 100 [IU]/ML
3 INJECTION, SOLUTION INTRAVENOUS; SUBCUTANEOUS
Status: DISCONTINUED | OUTPATIENT
Start: 2017-07-21 | End: 2017-07-23 | Stop reason: HOSPADM

## 2017-07-21 RX ORDER — INSULIN ASPART 100 [IU]/ML
1-10 INJECTION, SOLUTION INTRAVENOUS; SUBCUTANEOUS
Status: DISCONTINUED | OUTPATIENT
Start: 2017-07-21 | End: 2017-07-23 | Stop reason: HOSPADM

## 2017-07-21 RX ORDER — MOXIFLOXACIN HYDROCHLORIDE 400 MG/1
400 TABLET ORAL DAILY
Status: DISCONTINUED | OUTPATIENT
Start: 2017-07-21 | End: 2017-07-23 | Stop reason: HOSPADM

## 2017-07-21 RX ORDER — SODIUM CHLORIDE 9 MG/ML
INJECTION, SOLUTION INTRAVENOUS
Status: DISCONTINUED | OUTPATIENT
Start: 2017-07-21 | End: 2017-07-23 | Stop reason: HOSPADM

## 2017-07-21 RX ORDER — SODIUM CHLORIDE 9 MG/ML
INJECTION, SOLUTION INTRAVENOUS ONCE
Status: DISCONTINUED | OUTPATIENT
Start: 2017-07-21 | End: 2017-07-21

## 2017-07-21 RX ADMIN — ASPIRIN 81 MG: 81 TABLET, COATED ORAL at 08:07

## 2017-07-21 RX ADMIN — INSULIN ASPART 3 UNITS: 100 INJECTION, SOLUTION INTRAVENOUS; SUBCUTANEOUS at 11:07

## 2017-07-21 RX ADMIN — FAMOTIDINE 20 MG: 10 INJECTION, SOLUTION INTRAVENOUS at 08:07

## 2017-07-21 RX ADMIN — SEVELAMER CARBONATE 800 MG: 800 TABLET, FILM COATED ORAL at 05:07

## 2017-07-21 RX ADMIN — SEVELAMER CARBONATE 800 MG: 800 TABLET, FILM COATED ORAL at 08:07

## 2017-07-21 RX ADMIN — SIMVASTATIN 40 MG: 40 TABLET, FILM COATED ORAL at 08:07

## 2017-07-21 RX ADMIN — MOXIFLOXACIN HYDROCHLORIDE 400 MG: 400 TABLET, FILM COATED ORAL at 01:07

## 2017-07-21 RX ADMIN — INSULIN ASPART 2 UNITS: 100 INJECTION, SOLUTION INTRAVENOUS; SUBCUTANEOUS at 05:07

## 2017-07-21 RX ADMIN — INSULIN ASPART 2 UNITS: 100 INJECTION, SOLUTION INTRAVENOUS; SUBCUTANEOUS at 08:07

## 2017-07-21 RX ADMIN — INSULIN ASPART 4 UNITS: 100 INJECTION, SOLUTION INTRAVENOUS; SUBCUTANEOUS at 11:07

## 2017-07-21 RX ADMIN — INSULIN ASPART 4 UNITS: 100 INJECTION, SOLUTION INTRAVENOUS; SUBCUTANEOUS at 07:07

## 2017-07-21 RX ADMIN — INSULIN DETEMIR 5 UNITS: 100 INJECTION, SOLUTION SUBCUTANEOUS at 09:07

## 2017-07-21 RX ADMIN — SEVELAMER CARBONATE 800 MG: 800 TABLET, FILM COATED ORAL at 11:07

## 2017-07-21 RX ADMIN — INSULIN ASPART 3 UNITS: 100 INJECTION, SOLUTION INTRAVENOUS; SUBCUTANEOUS at 05:07

## 2017-07-21 NOTE — SUBJECTIVE & OBJECTIVE
Past Medical History:   Diagnosis Date    Abnormal blood electrolyte level 06/28/2016    Anemia in chr kidney dis     Dependence on renal dialysis 06/03/2014    Diabetes mellitus     Hyperlipidemia     Hypertension     Infection and inflammatory reaction due to cardiac device, implant, and graft 10/26/2011    Secondary hyperparathyroidism of renal origin 08/15/2011    Unspecified protein-calorie malnutrition 06/18/2013    Vitamin D deficiency 06/28/2016       Past Surgical History:   Procedure Laterality Date    VASCULAR SURGERY         Review of patient's allergies indicates:  No Known Allergies    Current Neurological Medications: None    No current facility-administered medications on file prior to encounter.      Current Outpatient Prescriptions on File Prior to Encounter   Medication Sig    cinacalcet (SENSIPAR) 30 MG Tab Take 30 mg by mouth before dinner.    losartan (COZAAR) 50 MG tablet Take 50 mg by mouth once daily.    metoprolol succinate (TOPROL-XL) 50 MG 24 hr tablet Take 25 mg by mouth 2 (two) times daily.    simvastatin (ZOCOR) 40 MG tablet Take 40 mg by mouth every evening.    castor oil liquid Take by mouth every Tues, Thurs, Sat.    insulin aspart protamine-insulin aspart (NOVOLOG 70/30) 100 unit/mL (70-30) InPn pen Inject into the skin 2 (two) times daily before meals.    sevelamer carbonate (RENVELA) 800 mg Tab Take 800 mg by mouth 3 (three) times daily with meals.    vitamin renal formula, B-complex-vitamin c-folic acid, (NEPHROCAPS) 1 mg Cap Take 1 capsule by mouth once daily.     Family History     None        Social History Main Topics    Smoking status: Never Smoker    Smokeless tobacco: Never Used    Alcohol use No    Drug use: No    Sexual activity: No     Review of Systems   Constitutional: Negative for chills and fever.   Respiratory: Negative for shortness of breath.    Cardiovascular: Negative for chest pain.   Gastrointestinal: Negative for abdominal pain,  constipation, diarrhea, nausea and vomiting.     Objective:     Vital Signs (Most Recent):  Temp: 99.1 °F (37.3 °C) (07/21/17 0800)  Pulse: 74 (07/21/17 1100)  Resp: 19 (07/21/17 1100)  BP: (!) 117/56 (07/21/17 1100)  SpO2: 96 % (07/21/17 1100) Vital Signs (24h Range):  Temp:  [98.3 °F (36.8 °C)-99.1 °F (37.3 °C)] 99.1 °F (37.3 °C)  Pulse:  [] 74  Resp:  [13-29] 19  SpO2:  [93 %-99 %] 96 %  BP: ()/(44-66) 117/56     Weight: 93 kg (205 lb 0.4 oz)  Body mass index is 30.28 kg/m².    Physical Exam   Constitutional: He appears well-developed and well-nourished. No distress.   HENT:   Head: Normocephalic and atraumatic.   Nose: Nose normal.   Mouth/Throat: Oropharynx is clear and moist. No oropharyngeal exudate.   Eyes: Conjunctivae are normal. Right eye exhibits no discharge. Left eye exhibits no discharge. No scleral icterus.   Neck: Normal range of motion. Neck supple. No JVD present. No tracheal deviation present. No thyromegaly present.   Cardiovascular: Normal rate, regular rhythm, normal heart sounds and intact distal pulses.  Exam reveals no gallop and no friction rub.    No murmur heard.  Pulmonary/Chest: Effort normal and breath sounds normal. No stridor. No respiratory distress. He has no wheezes. He has no rales. He exhibits no tenderness.   Abdominal: Soft. Bowel sounds are normal. He exhibits no distension and no mass. There is no tenderness. There is no rebound and no guarding.   Musculoskeletal: Normal range of motion. He exhibits no tenderness.   Left arm swelling improving with good thrill.   Lymphadenopathy:     He has no cervical adenopathy.   Neurological: He is alert. He has normal reflexes. He displays normal reflexes. No cranial nerve deficit. He exhibits normal muscle tone. Coordination normal.   Improving mental status.   Skin: Skin is warm and dry. No rash noted. He is not diaphoretic. No erythema. No pallor.   Psychiatric: He has a normal mood and affect. His behavior is normal.  Judgment and thought content normal.            Significant Labs: All pertinent lab results from the past 24 hours have been reviewed.    Significant Imaging: I have reviewed all pertinent imaging results/findings within the past 24 hours.

## 2017-07-21 NOTE — PROGRESS NOTES
[facility] Hospital Medicine  Progress Note    Patient Name: Rahul Barrios  MRN: [id number]  Patient Class: IP- Inpatient   Admission Date: 7/17/2017  Length of Stay: 4 days  Attending Physician: Kenny Jansen MD  Primary Care Provider: Herb Maldonado MD        Subjective:     Principal Problem:Acute pulmonary embolism    HPI:  See admission history and physical exam note by Dr. Cordell Heredia.    Hospital Course:  Patient admitted to the intensive care unit and started in heparin infusion to treat acute pulmonary embolus.  Patient's blood pressure improved after he was intubated and never required vasopressor support.  Patient's mental status improved with dialysis and empiric broad-spectrum antibiotics.  Patient successful extubated in the morning on 7/19/2017.  Mental status improving with treatment of pneumonia and further dialysis.    Past Medical History:   Diagnosis Date    Abnormal blood electrolyte level 06/28/2016    Anemia in chr kidney dis     Dependence on renal dialysis 06/03/2014    Diabetes mellitus     Hyperlipidemia     Hypertension     Infection and inflammatory reaction due to cardiac device, implant, and graft 10/26/2011    Secondary hyperparathyroidism of renal origin 08/15/2011    Unspecified protein-calorie malnutrition 06/18/2013    Vitamin D deficiency 06/28/2016       Past Surgical History:   Procedure Laterality Date    VASCULAR SURGERY         Review of patient's allergies indicates:  No Known Allergies    Current Neurological Medications: None    No current facility-administered medications on file prior to encounter.      Current Outpatient Prescriptions on File Prior to Encounter   Medication Sig    cinacalcet (SENSIPAR) 30 MG Tab Take 30 mg by mouth before dinner.    losartan (COZAAR) 50 MG tablet Take 50 mg by mouth once daily.    metoprolol succinate (TOPROL-XL) 50 MG 24 hr tablet Take 25 mg by mouth 2 (two) times daily.    simvastatin (ZOCOR) 40  MG tablet Take 40 mg by mouth every evening.    castor oil liquid Take by mouth every Tues, Thurs, Sat.    insulin aspart protamine-insulin aspart (NOVOLOG 70/30) 100 unit/mL (70-30) InPn pen Inject into the skin 2 (two) times daily before meals.    sevelamer carbonate (RENVELA) 800 mg Tab Take 800 mg by mouth 3 (three) times daily with meals.    vitamin renal formula, B-complex-vitamin c-folic acid, (NEPHROCAPS) 1 mg Cap Take 1 capsule by mouth once daily.     Family History     None        Social History Main Topics    Smoking status: Never Smoker    Smokeless tobacco: Never Used    Alcohol use No    Drug use: No    Sexual activity: No     Review of Systems   Constitutional: Negative for chills and fever.   Respiratory: Negative for shortness of breath.    Cardiovascular: Negative for chest pain.   Gastrointestinal: Negative for abdominal pain, constipation, diarrhea, nausea and vomiting.     Objective:     Vital Signs (Most Recent):  Temp: 99.1 °F (37.3 °C) (07/21/17 0800)  Pulse: 74 (07/21/17 1100)  Resp: 19 (07/21/17 1100)  BP: (!) 117/56 (07/21/17 1100)  SpO2: 96 % (07/21/17 1100) Vital Signs (24h Range):  Temp:  [98.3 °F (36.8 °C)-99.1 °F (37.3 °C)] 99.1 °F (37.3 °C)  Pulse:  [] 74  Resp:  [13-29] 19  SpO2:  [93 %-99 %] 96 %  BP: ()/(44-66) 117/56     Weight: 93 kg (205 lb 0.4 oz)  Body mass index is 30.28 kg/m².    Physical Exam   Constitutional: He appears well-developed and well-nourished. No distress.   HENT:   Head: Normocephalic and atraumatic.   Nose: Nose normal.   Mouth/Throat: Oropharynx is clear and moist. No oropharyngeal exudate.   Eyes: Conjunctivae are normal. Right eye exhibits no discharge. Left eye exhibits no discharge. No scleral icterus.   Neck: Normal range of motion. Neck supple. No JVD present. No tracheal deviation present. No thyromegaly present.   Cardiovascular: Normal rate, regular rhythm, normal heart sounds and intact distal pulses.  Exam reveals no gallop  and no friction rub.    No murmur heard.  Pulmonary/Chest: Effort normal and breath sounds normal. No stridor. No respiratory distress. He has no wheezes. He has no rales. He exhibits no tenderness.   Abdominal: Soft. Bowel sounds are normal. He exhibits no distension and no mass. There is no tenderness. There is no rebound and no guarding.   Musculoskeletal: Normal range of motion. He exhibits no tenderness.   Left arm swelling improving with good thrill.   Lymphadenopathy:     He has no cervical adenopathy.   Neurological: He is alert. He has normal reflexes. He displays normal reflexes. No cranial nerve deficit. He exhibits normal muscle tone. Coordination normal.   Improving mental status.   Skin: Skin is warm and dry. No rash noted. He is not diaphoretic. No erythema. No pallor.   Psychiatric: He has a normal mood and affect. His behavior is normal. Judgment and thought content normal.            Significant Labs: All pertinent lab results from the past 24 hours have been reviewed.    Significant Imaging: I have reviewed all pertinent imaging results/findings within the past 24 hours.    Assessment/Plan:      * Acute pulmonary embolism    I spoke with Dr. Swati Hennessy who says that a small pulmonary embolus following de-clotting procedure is expected and these patients are not typically treated with anticoagulation. Echocardiogram does not show evidence of right-sided heart strain and he is not hypoxic.  I suspect acute worsening mental status more related to sedation from procedure, uremia from inadequate dialysis due to nonfunctioning access, and also aspiration pneumonia. Will discontinue anticoagulation at this time and continue to monitor.        Acute respiratory failure with hypoxia    Small pulmonary embolus not likely to cause hypoxia on presentation.  I suspect this is more related to aspiration pneumonia based on imaging and elevated procalcitonin.  Status post extubated on 7/19/2017. Patient  doing well off ventilator.  Cultures negative and therefore will de-escalate antibiotic treatment at this time.        Septic shock    Secondary to aspiration pneumonia.  Improving.  Cultures negative.  Will de-escalate antibiotic treatment.        Diabetic ketoacidosis without coma associated with type 2 diabetes mellitus    Improving with insulin, will continue and increase insulin.  Increased anion gap at this time mostly due to inadequate dialysis likely due to issues with his current access and improving with dialysis.        Anemia in chronic kidney disease              ESRD on dialysis    Patient received dialysis via current access with improvement.  Plan to continue with dialysis.        Metabolic acidosis    I suspect mostly due to inadequate dialysis.  Plan for further dialysis.        Type 2 diabetes mellitus with complication, with long-term current use of insulin               Anemia due to pre-ESRD treated with erythropoietin                VTE Risk Mitigation         Ordered     Medium Risk of VTE  Once      07/18/17 0037          Kenny Jansen MD  Department of Hospital Medicine   [facility]

## 2017-07-21 NOTE — PLAN OF CARE
Problem: Diabetes, Type 2 (Adult)  Goal: Signs and Symptoms of Listed Potential Problems Will be Absent, Minimized or Managed (Diabetes, Type 2)  Signs and symptoms of listed potential problems will be absent, minimized or managed by discharge/transition of care (reference Diabetes, Type 2 (Adult) CPG).   Outcome: Ongoing (interventions implemented as appropriate)  Pt states that he checks his blood sugar daily   But questioned me when I explained that I was sticking his finger to check his blood sugar   Then covering him with insulin since he was over 200 prior to eating

## 2017-07-21 NOTE — PLAN OF CARE
Problem: Physical Therapy Goal  Goal: Physical Therapy Goal  Goals to be met by: 17     Patient will increase functional independence with mobility by performin. Supine to sit with modified independence.   2. Sit to supine with modified independence.   3. Sit<>stand transfer with modified independence using rolling walker.   4. Gait x 50 feet with modified independence using rolling walker.    Outcome: Ongoing (interventions implemented as appropriate)  PT evaluation completed. Please see progress note for details, POC, and recommendations.

## 2017-07-21 NOTE — PLAN OF CARE
"Problem: SLP Goal  Goal: SLP Goal  1. Pt will be able to consume a dental soft diet with thin liquids with no signs of airway threat, aspiration, esophageal backflow given min assistance to follow through on compensatory strategies: small sips, small bites, slow rate of intake, alternate liquids and solids  Outcome: Ongoing (interventions implemented as appropriate)  Clinical swallowing evaluation completed. See report for details.     Comments: Speech to follow for follow up of diet tolerance given pt complaint of food going "down the wrong pipe", nursing reports of coughing mid meal, end of meal and after meal, 4-5x/week  "

## 2017-07-21 NOTE — PT/OT/SLP EVAL
"Physical Therapy  Evaluation and Treatment    Rahul Barrios   MRN: [id number]   Admitting Diagnosis: Acute pulmonary embolism    PT Received On: 07/21/17  PT Start Time: 1326     PT Stop Time: 1354    PT Total Time (min): 28 min       Billable Minutes:  Evaluation 10 and Therapeutic Activity 18    Diagnosis: Acute pulmonary embolism    Past Medical History:   Diagnosis Date    Abnormal blood electrolyte level 06/28/2016    Anemia in chr kidney dis     Dependence on renal dialysis 06/03/2014    Diabetes mellitus     Hyperlipidemia     Hypertension     Infection and inflammatory reaction due to cardiac device, implant, and graft 10/26/2011    Secondary hyperparathyroidism of renal origin 08/15/2011    Unspecified protein-calorie malnutrition 06/18/2013    Vitamin D deficiency 06/28/2016      Past Surgical History:   Procedure Laterality Date    VASCULAR SURGERY       Referring physician: Justyna  Date referred to PT: 7/21/2017    General Precautions: Standard, fall  Orthopedic Precautions: N/A     Do you have any cultural, spiritual, Anglican conflicts, given your current situation?: None specified    Patient History:  Living Environment Comment: Pt reports he lives with his brother in a raised 1 story house with elevator access. He has a tub shower. He is legally blind and requires guidance with OOB mobility. He reports he intermittently requires a rolling walker "depending on the day"   Equipment Currently Used at Home: walker, rolling  DME owned (not currently used): none    Previous Level of Function:  Ambulation Skills: needs device and assist  Transfer Skills: needs assist  ADL Skills: needs assist  Work/Leisure Activity: needs assist    Subjective:  Communicated with RN prior to session.    Chief Complaint: weakness  Patient goals: To return to PLOF    Pain/Comfort  Pain Rating 1:  ("a little in my R arm, they keep sticking me with needles")  Pain Addressed 1: Reposition, Distraction  Pain Rating " Post-Intervention 1: 0/10    Objective:   Patient found with: peripheral IV, pulse ox (continuous), telemetry, blood pressure cuff     Cognitive Exam:  Oriented to: Person, Place, Time and Situation    Follows Commands/attention: Follows one-step commands  Safety awareness/insight to disability: impaired    Physical Exam:  Postural examination/scapula alignment: Rounded shoulder and Head forward    Skin integrity: Visible skin intact    Sensation:   Intact    Lower Extremity Range of Motion:  Right Lower Extremity: WFL  Left Lower Extremity: WFL    Lower Extremity Strength:  Right Lower Extremity: Grossly 4-  Left Lower Extremity: Grossly 4-    Gross motor coordination: WFL    Functional Mobility:  Bed Mobility:  Supine to Sit: Moderate Assistance (HOB elevated; pt required tactile cues and verbal cues for technique and safety)  Sit to Supine: Moderate Assistance (at trunk and BLE for clearance onto bed)    Transfers:  Sit <> Stand Assistance: Moderate Assistance  Sit <> Stand Assistive Device: Rolling Walker, No Assistive Device (x 1 without AD x 1 with RW; pt unable to achieve full erect standing despite tactile cues to correct)    Balance:   Static Sit: FAIR: Maintains without assist, but unable to take any challenges   Dynamic Sit: FAIR: Cannot move trunk without losing balance  Static Stand: POOR+: Needs MINIMAL assist to maintain  Dynamic stand: POOR: N/A    AM-PAC 6 CLICK MOBILITY  How much help from another person does this patient currently need?   1 = Unable, Total/Dependent Assistance  2 = A lot, Maximum/Moderate Assistance  3 = A little, Minimum/Contact Guard/Supervision  4 = None, Modified Stamps/Independent    Turning over in bed (including adjusting bedclothes, sheets and blankets)?: 2  Sitting down on and standing up from a chair with arms (e.g., wheelchair, bedside commode, etc.): 2  Moving from lying on back to sitting on the side of the bed?: 2  Moving to and from a bed to a chair (including  a wheelchair)?: 1  Need to walk in hospital room?: 1  Climbing 3-5 steps with a railing?: 1  Total Score: 9     AM-PAC Raw Score CMS G-Code Modifier Level of Impairment Assistance   6 % Total / Unable   7 - 9 CM 80 - 100% Maximal Assist   10 - 14 CL 60 - 80% Moderate Assist   15 - 19 CK 40 - 60% Moderate Assist   20 - 22 CJ 20 - 40% Minimal Assist   23 CI 1-20% SBA / CGA   24 CH 0% Independent/ Mod I     Patient left supine with all lines intact, call button in reach, bed alarm on and RN notified.    Assessment:   Rahul Barrios is a 77 y.o. male with a medical diagnosis of Acute pulmonary embolism and presents with admit 17 with intubation during hospital stay. Pt presents with weakness and decreased activity endurance currently requiring mod A for transfers and unable to perform gait. Pt would benefit from continued skilled PT to maximize independence and safety with functional mobility.    Rehab identified problem list/impairments: Rehab identified problem list/impairments: impaired endurance, weakness, gait instability, impaired self care skills, impaired functional mobilty, decreased safety awareness, visual deficits, decreased lower extremity function, edema, impaired balance    Rehab potential is good.    Activity tolerance: Good    Discharge recommendations: Discharge Facility/Level Of Care Needs: rehabilitation facility     Barriers to discharge: Barriers to Discharge: Inaccessible home environment, Decreased caregiver support    Equipment recommendations: Equipment Needed After Discharge: 3-in-1 commode     GOALS:    Physical Therapy Goals        Problem: Physical Therapy Goal    Goal Priority Disciplines Outcome Goal Variances Interventions   Physical Therapy Goal     PT/OT, PT Ongoing (interventions implemented as appropriate)     Description:  Goals to be met by: 17     Patient will increase functional independence with mobility by performin. Supine to sit with modified  independence.   2. Sit to supine with modified independence.   3. Sit<>stand transfer with modified independence using rolling walker.   4. Gait x 50 feet with modified independence using rolling walker.                   PLAN:    Patient to be seen daily to address the above listed problems via therapeutic activities, therapeutic exercises, gait training, neuromuscular re-education  Plan of Care expires: 08/20/17  Plan of Care reviewed with: patient    Michela CONTEH Tadeo, PT  07/21/2017

## 2017-07-21 NOTE — PLAN OF CARE
Problem: Patient Care Overview  Goal: Plan of Care Review  Outcome: Ongoing (interventions implemented as appropriate)  Pt slightly confused this AM; MD notified. Pt confusion improved throughout the shift .  Central line removed this AM without any issues. Nephrology aware of yudith U/S results. HD ordered and 1500ml taken off with dialysis. Pt continues to be at therapeutic aPTT range with heparin gtt at 9 units/kg/hr. Sibling at bedside. Pt resting comfortably.

## 2017-07-21 NOTE — ASSESSMENT & PLAN NOTE
I spoke with Dr. Swati Hennessy who says that a small pulmonary embolus following de-clotting procedure is expected and these patients are not typically treated with anticoagulation. Echocardiogram does not show evidence of right-sided heart strain and he is not hypoxic.  I suspect acute worsening mental status more related to sedation from procedure, uremia from inadequate dialysis due to nonfunctioning access, and also aspiration pneumonia. Will discontinue anticoagulation at this time and continue to monitor.

## 2017-07-21 NOTE — PT/OT/SLP EVAL
"Speech Language Pathology  Evaluation    Rahul Barrios   MRN: [id number]   Admitting Diagnosis: Acute pulmonary embolism    Diet recommendations: Solid Diet Level: Dental Soft  Liquid Diet Level: Thin Feed only when awake/alert, HOB to 90 degrees, Small bites/sips, Alternating bites/sips, 1 bite/sip at a time, Remain upright 30 minutes post meal and Assistance with meals    SLP Treatment Date: 07/21/17  Speech Start Time: 1416     Speech Stop Time: 1432     Speech Total (min): 16 min       TREATMENT BILLABLE MINUTES:  Eval Swallow and Oral Function 16 min    Diagnosis: Acute pulmonary embolism  RN reporting coughing during meals, at the end of meals and after oral intake. Pt reports food "going down the wrong pipe." Pt's brother at bedside reports noting coughing with meals at home several month prior to this admission. Aspiration PNA noted in medical record.    Past Medical History:   Diagnosis Date    Abnormal blood electrolyte level 06/28/2016    Anemia in chr kidney dis     Dependence on renal dialysis 06/03/2014    Diabetes mellitus     Hyperlipidemia     Hypertension     Infection and inflammatory reaction due to cardiac device, implant, and graft 10/26/2011    Secondary hyperparathyroidism of renal origin 08/15/2011    Unspecified protein-calorie malnutrition 06/18/2013    Vitamin D deficiency 06/28/2016     Past Surgical History:   Procedure Laterality Date    VASCULAR SURGERY         Has the patient been evaluated by SLP for swallowing? : Yes  Keep patient NPO?: No   General Precautions: Standard, aspiration (reflux)        Subjective:  Pt sitting up in bed visiting with his brother. Report feeling like food is going down the "wrong pipe."    Objective:    ORAL MOTOR: Missing dentition. Face is symmetrical at rest and during smile. Labial and lingual strength and ROM are WFL. Vocal quality is clear. Speech is 100% intelligible at the simple sentence level.    Bedside Swallow Eval:  Consistencies " Assessed: Pt trialed on thin liquids (3, 5 and 10 ml amounts), purees and solids.    Oral Phase: WFL for lip seal, ability to form a cohesive bolus and a-p transport of liquids, purees and solids. Mildly slow mastication of solids due to missing dentition.     Pharyngeal Phase: trigger of the swallow reflex appears delayed. No overt signs of airway threat or aspiration during or after swallow on 3-10 ml sips of thin liquids via cup or straw, pudding or solids. No coughing, throat clearing or change in vocal quality during this eval.  Mild signs of pharyngeal residuals with solids with need for 2 swallows per bolus. Frequent belching noted after each swallow.    Assessment:  Rahul Barrios is a 77 y.o. male with a medical diagnosis of Acute pulmonary embolism and presents with oral and pharyngeal swallow appear WFL during this brief assessment. However pt, family and RN report coughing with meals. Speech to continue to follow and assess during full meal. MBS with esophageal follow through may be indicated. Will continue to assess.       Goals:    SLP Goals        Problem: SLP Goal    Goal Priority Disciplines Outcome   SLP Goal     SLP Ongoing (interventions implemented as appropriate)   Description:  1. Pt will be able to consume a dental soft diet with thin liquids with no signs of airway threat, aspiration, esophageal backflow given min assistance to follow through on compensatory strategies: small sips, small bites, slow rate of intake, alternate liquids and solids                     Plan:   Patient to be seen Therapy Frequency: 4 x/week, 5 x/week   Plan of Care expires:    Plan of Care reviewed with: patient, sibling  SLP Follow-up?: Yes              Sherita Rivera CCC-SLP  07/21/2017

## 2017-07-21 NOTE — PROGRESS NOTES
Progress Note  Nephrology      Consult Requested By: Kenny Jansen MD      SUBJECTIVE:     Overnight events  Patient is a 77 y.o. male presents with PE, hypotension, hypoxia s/p declot procedure.    No overnight events.  Feels ok.  No N/V.  Had HD yesterday.  Tolerated well without difficulty.     Patient Active Problem List   Diagnosis    Mechanical complication of other vascular device, implant, and graft    Failure of hemodialysis access    Anemia in chronic kidney disease    Metabolic acidosis    Type 2 diabetes mellitus with complication, with long-term current use of insulin    ESRD on dialysis    Acute pulmonary embolism    Anemia due to pre-ESRD treated with erythropoietin    Acute respiratory failure with hypoxia    Diabetic ketoacidosis without coma associated with type 2 diabetes mellitus    Septic shock     Past Medical History:   Diagnosis Date    Abnormal blood electrolyte level 06/28/2016    Anemia in chr kidney dis     Dependence on renal dialysis 06/03/2014    Diabetes mellitus     Hyperlipidemia     Hypertension     Infection and inflammatory reaction due to cardiac device, implant, and graft 10/26/2011    Secondary hyperparathyroidism of renal origin 08/15/2011    Unspecified protein-calorie malnutrition 06/18/2013    Vitamin D deficiency 06/28/2016              OBJECTIVE:     Vitals:    07/21/17 0400 07/21/17 0430 07/21/17 0500 07/21/17 0600   BP: (!) 119/59  119/66 (!) 118/58   Pulse: 90  90 86   Resp: (!) 22  18 16   Temp:       TempSrc:       SpO2: 96%  (!) 93% (!) 94%   Weight:  93 kg (205 lb 0.4 oz)     Height:           Temp: 98.5 °F (36.9 °C) (07/21/17 0330)  Pulse: 86 (07/21/17 0600)  Resp: 16 (07/21/17 0600)  BP: (!) 118/58 (07/21/17 0600)  SpO2: (!) 94 % (07/21/17 0600)               Medications:   sodium chloride 0.9%   Intravenous Once    sodium chloride 0.9%   Intravenous Once    sodium chloride 0.9%   Intravenous Once    aspirin  81 mg Oral Daily     famotidine (PF)  20 mg Intravenous BID    insulin detemir  5 Units Subcutaneous QHS    piperacillin-tazobactam 4.5 g in dextrose 5 % 100 mL IVPB (ready to mix system)  4.5 g Intravenous Q12H    sevelamer carbonate  800 mg Oral TID WM    simvastatin  40 mg Oral QHS      heparin (porcine) in D5W 9 Units/kg/hr (07/20/17 1049)               Physical Exam:  General appearance: awake, alert  CV: RRR  Ext: no edema in lower extremities.  LUE swelling improving.  + thrill in AVF      Laboratory:    Labs reviewed  Recent Results (from the past 336 hour(s))   Basic metabolic panel    Collection Time: 07/21/17  3:39 AM   Result Value Ref Range    Sodium 139 136 - 145 mmol/L    Potassium 4.3 3.5 - 5.1 mmol/L    Chloride 99 95 - 110 mmol/L    CO2 19 (L) 23 - 29 mmol/L    BUN, Bld 44 (H) 8 - 23 mg/dL    Creatinine 8.0 (H) 0.5 - 1.4 mg/dL    Calcium 8.7 8.7 - 10.5 mg/dL    Anion Gap 21 (H) 8 - 16 mmol/L   Basic metabolic panel    Collection Time: 07/20/17  4:15 AM   Result Value Ref Range    Sodium 139 136 - 145 mmol/L    Potassium 4.2 3.5 - 5.1 mmol/L    Chloride 98 95 - 110 mmol/L    CO2 20 (L) 23 - 29 mmol/L    BUN, Bld 84 (H) 8 - 23 mg/dL    Creatinine 13.0 (H) 0.5 - 1.4 mg/dL    Calcium 8.3 (L) 8.7 - 10.5 mg/dL    Anion Gap 21 (H) 8 - 16 mmol/L   Basic metabolic panel    Collection Time: 07/19/17  5:37 AM   Result Value Ref Range    Sodium 140 136 - 145 mmol/L    Potassium 4.1 3.5 - 5.1 mmol/L    Chloride 100 95 - 110 mmol/L    CO2 20 (L) 23 - 29 mmol/L    BUN, Bld 71 (H) 8 - 23 mg/dL    Creatinine 11.3 (H) 0.5 - 1.4 mg/dL    Calcium 7.8 (L) 8.7 - 10.5 mg/dL    Anion Gap 20 (H) 8 - 16 mmol/L     Recent Results (from the past 336 hour(s))   CBC auto differential    Collection Time: 07/21/17  3:39 AM   Result Value Ref Range    WBC 9.05 3.90 - 12.70 K/uL    Hemoglobin 8.1 (L) 14.0 - 18.0 g/dL    Hematocrit 24.5 (L) 40.0 - 54.0 %    Platelets 180 150 - 350 K/uL   CBC auto differential    Collection Time: 07/19/17  2:00 AM    Result Value Ref Range    WBC 12.07 3.90 - 12.70 K/uL    Hemoglobin 8.6 (L) 14.0 - 18.0 g/dL    Hematocrit 25.1 (L) 40.0 - 54.0 %    Platelets 171 150 - 350 K/uL   CBC auto differential    Collection Time: 07/18/17  6:30 AM   Result Value Ref Range    WBC 15.89 (H) 3.90 - 12.70 K/uL    Hemoglobin 9.0 (L) 14.0 - 18.0 g/dL    Hematocrit 27.1 (L) 40.0 - 54.0 %    Platelets 195 150 - 350 K/uL         ASSESSMENT/PLAN:     Patient Active Problem List   Diagnosis    Mechanical complication of other vascular device, implant, and graft    Failure of hemodialysis access    Anemia in chronic kidney disease    Metabolic acidosis    Type 2 diabetes mellitus with complication, with long-term current use of insulin    ESRD on dialysis    Acute pulmonary embolism    Anemia due to pre-ESRD treated with erythropoietin    Acute respiratory failure with hypoxia    Diabetic ketoacidosis without coma associated with type 2 diabetes mellitus    Septic shock       Plan:   1. ESRD:  HD MWF at [facility] with Dr. Maldonado  - will do HD x 3 hours tomorrow.  Then can go back on regular schedule Monday.   -  tolerated     2. Mechanical complication AVF: s/p declot of left arm AVF with central stent placement.  Left arm swelling seems to have improved.  US shows a bit of non occulsive thrombus but much of this likely related to edema/external compression.  Good thrill in access and able to get about 350 mL/min on HD yesterday.  Will use the AVF for now.  Will need to monitor closely for changes. Edema is improving     3. Hypoxia/altered mental status: unclear etiology- possibly from PE, less likely sedation.   - improving  - on abx for possible PNA     4. HTN: stable     5. Anemia ESRD: will give EPO with HD     Will cont to follow with you.     Swati Hennessy MD  [facility]

## 2017-07-21 NOTE — ASSESSMENT & PLAN NOTE
Improving with insulin, will continue and increase insulin.  Increased anion gap at this time mostly due to inadequate dialysis likely due to issues with his current access and improving with dialysis.

## 2017-07-21 NOTE — NURSING
Pt worked with physical therapy and did stand on side of the bed  Pt also worked with speech therapy and will follow up with modified swallow study   Pt continues to cough sometimes during the meal and then after

## 2017-07-21 NOTE — ASSESSMENT & PLAN NOTE
Small pulmonary embolus not likely to cause hypoxia on presentation.  I suspect this is more related to aspiration pneumonia based on imaging and elevated procalcitonin.  Status post extubated on 7/19/2017. Patient doing well off ventilator.  Cultures negative and therefore will de-escalate antibiotic treatment at this time.

## 2017-07-21 NOTE — NURSING
Pt does cough off and on throughout the shift  After eating and when just resting  Will discuss speech consult with MD

## 2017-07-21 NOTE — ASSESSMENT & PLAN NOTE
Secondary to aspiration pneumonia.  Improving.  Cultures negative.  Will de-escalate antibiotic treatment.

## 2017-07-22 LAB
ANION GAP SERPL CALC-SCNC: 17 MMOL/L
BASOPHILS # BLD AUTO: 0.02 K/UL
BASOPHILS NFR BLD: 0.2 %
BUN SERPL-MCNC: 70 MG/DL
CALCIUM SERPL-MCNC: 8.7 MG/DL
CHLORIDE SERPL-SCNC: 99 MMOL/L
CO2 SERPL-SCNC: 23 MMOL/L
CREAT SERPL-MCNC: 10.3 MG/DL
DIFFERENTIAL METHOD: ABNORMAL
EOSINOPHIL # BLD AUTO: 0.2 K/UL
EOSINOPHIL NFR BLD: 2 %
ERYTHROCYTE [DISTWIDTH] IN BLOOD BY AUTOMATED COUNT: 14.7 %
EST. GFR  (AFRICAN AMERICAN): 5 ML/MIN/1.73 M^2
EST. GFR  (NON AFRICAN AMERICAN): 4 ML/MIN/1.73 M^2
GLUCOSE SERPL-MCNC: 156 MG/DL
HCT VFR BLD AUTO: 24.3 %
HGB BLD-MCNC: 7.9 G/DL
LYMPHOCYTES # BLD AUTO: 1.2 K/UL
LYMPHOCYTES NFR BLD: 13.6 %
MAGNESIUM SERPL-MCNC: 1.9 MG/DL
MCH RBC QN AUTO: 32 PG
MCHC RBC AUTO-ENTMCNC: 32.5 G/DL
MCV RBC AUTO: 98 FL
MONOCYTES # BLD AUTO: 1.6 K/UL
MONOCYTES NFR BLD: 17.7 %
NEUTROPHILS # BLD AUTO: 5.8 K/UL
NEUTROPHILS NFR BLD: 65.9 %
PHOSPHATE SERPL-MCNC: 6.3 MG/DL
PLATELET # BLD AUTO: 184 K/UL
PMV BLD AUTO: 10.9 FL
POCT GLUCOSE: 129 MG/DL (ref 70–110)
POCT GLUCOSE: 153 MG/DL (ref 70–110)
POCT GLUCOSE: 169 MG/DL (ref 70–110)
POCT GLUCOSE: 200 MG/DL (ref 70–110)
POTASSIUM SERPL-SCNC: 4.4 MMOL/L
RBC # BLD AUTO: 2.47 M/UL
SODIUM SERPL-SCNC: 139 MMOL/L
VANCOMYCIN SERPL-MCNC: 13.3 UG/ML
WBC # BLD AUTO: 8.83 K/UL

## 2017-07-22 PROCEDURE — 97110 THERAPEUTIC EXERCISES: CPT

## 2017-07-22 PROCEDURE — 25000003 PHARM REV CODE 250: Performed by: HOSPITALIST

## 2017-07-22 PROCEDURE — 97166 OT EVAL MOD COMPLEX 45 MIN: CPT

## 2017-07-22 PROCEDURE — 63600175 PHARM REV CODE 636 W HCPCS: Performed by: INTERNAL MEDICINE

## 2017-07-22 PROCEDURE — 80048 BASIC METABOLIC PNL TOTAL CA: CPT

## 2017-07-22 PROCEDURE — 25000003 PHARM REV CODE 250: Performed by: INTERNAL MEDICINE

## 2017-07-22 PROCEDURE — 11000001 HC ACUTE MED/SURG PRIVATE ROOM

## 2017-07-22 PROCEDURE — 83735 ASSAY OF MAGNESIUM: CPT

## 2017-07-22 PROCEDURE — 99232 SBSQ HOSP IP/OBS MODERATE 35: CPT | Mod: ,,, | Performed by: HOSPITALIST

## 2017-07-22 PROCEDURE — 97530 THERAPEUTIC ACTIVITIES: CPT

## 2017-07-22 PROCEDURE — 84100 ASSAY OF PHOSPHORUS: CPT

## 2017-07-22 PROCEDURE — 85025 COMPLETE CBC W/AUTO DIFF WBC: CPT

## 2017-07-22 PROCEDURE — 97535 SELF CARE MNGMENT TRAINING: CPT

## 2017-07-22 PROCEDURE — 80100016 HC MAINTENANCE HEMODIALYSIS

## 2017-07-22 PROCEDURE — 80202 ASSAY OF VANCOMYCIN: CPT

## 2017-07-22 RX ORDER — SODIUM CHLORIDE 9 MG/ML
INJECTION, SOLUTION INTRAVENOUS ONCE
Status: DISCONTINUED | OUTPATIENT
Start: 2017-07-22 | End: 2017-07-23 | Stop reason: HOSPADM

## 2017-07-22 RX ORDER — SODIUM CHLORIDE 9 MG/ML
INJECTION, SOLUTION INTRAVENOUS
Status: DISCONTINUED | OUTPATIENT
Start: 2017-07-22 | End: 2017-07-23 | Stop reason: HOSPADM

## 2017-07-22 RX ADMIN — INSULIN DETEMIR 5 UNITS: 100 INJECTION, SOLUTION SUBCUTANEOUS at 09:07

## 2017-07-22 RX ADMIN — FAMOTIDINE 20 MG: 10 INJECTION, SOLUTION INTRAVENOUS at 09:07

## 2017-07-22 RX ADMIN — SEVELAMER CARBONATE 800 MG: 800 TABLET, FILM COATED ORAL at 05:07

## 2017-07-22 RX ADMIN — INSULIN ASPART 3 UNITS: 100 INJECTION, SOLUTION INTRAVENOUS; SUBCUTANEOUS at 11:07

## 2017-07-22 RX ADMIN — INSULIN ASPART 3 UNITS: 100 INJECTION, SOLUTION INTRAVENOUS; SUBCUTANEOUS at 07:07

## 2017-07-22 RX ADMIN — ERYTHROPOIETIN 10000 UNITS: 10000 INJECTION, SOLUTION INTRAVENOUS; SUBCUTANEOUS at 08:07

## 2017-07-22 RX ADMIN — SEVELAMER CARBONATE 800 MG: 800 TABLET, FILM COATED ORAL at 11:07

## 2017-07-22 RX ADMIN — SEVELAMER CARBONATE 800 MG: 800 TABLET, FILM COATED ORAL at 08:07

## 2017-07-22 RX ADMIN — SIMVASTATIN 40 MG: 40 TABLET, FILM COATED ORAL at 09:07

## 2017-07-22 RX ADMIN — INSULIN ASPART 2 UNITS: 100 INJECTION, SOLUTION INTRAVENOUS; SUBCUTANEOUS at 07:07

## 2017-07-22 RX ADMIN — FAMOTIDINE 20 MG: 10 INJECTION, SOLUTION INTRAVENOUS at 08:07

## 2017-07-22 RX ADMIN — INSULIN ASPART 1 UNITS: 100 INJECTION, SOLUTION INTRAVENOUS; SUBCUTANEOUS at 09:07

## 2017-07-22 RX ADMIN — INSULIN ASPART 2 UNITS: 100 INJECTION, SOLUTION INTRAVENOUS; SUBCUTANEOUS at 05:07

## 2017-07-22 RX ADMIN — INSULIN ASPART 3 UNITS: 100 INJECTION, SOLUTION INTRAVENOUS; SUBCUTANEOUS at 05:07

## 2017-07-22 NOTE — PT/OT/SLP EVAL
Occupational Therapy  Evaluation/Treatment  (overlap with PT treatment)    Rahul Barrios   MRN: [id number]   Admitting Diagnosis: Septic shock    OT Date of Treatment: 07/22/17   OT Start Time: 1401  OT Stop Time: 1452  OT Total Time (min): 51 min    Billable Minutes:  Evaluation 36  Self Care/Home Management 15    Diagnosis: Septic shock     Past Medical History:   Diagnosis Date    Abnormal blood electrolyte level 06/28/2016    Anemia in chr kidney dis     Dependence on renal dialysis 06/03/2014    Diabetes mellitus     Hyperlipidemia     Hypertension     Infection and inflammatory reaction due to cardiac device, implant, and graft 10/26/2011    Secondary hyperparathyroidism of renal origin 08/15/2011    Unspecified protein-calorie malnutrition 06/18/2013    Vitamin D deficiency 06/28/2016      Past Surgical History:   Procedure Laterality Date    VASCULAR SURGERY         Referring physician: Kenny Caamrgo  Date referred to OT: 7/21/17    General Precautions: Standard, fall, blind  Orthopedic Precautions: N/A  Braces: N/A    Do you have any cultural, spiritual, Catholic conflicts, given your current situation?: None     Patient History:  Living Environment  Lives With: sibling(s) (brother)  Living Arrangements: house (raised single story house)  Home Accessibility: stairs to enter home (elevator at entry)  Home Layout: Able to live on 1st floor  Number of Stairs to Enter Home: 20  Stair Railings at Home: outside, present on right side  Transportation Available: family or friend will provide (brother)  Living Environment Comment: the patient's brother has been assisting with his care, whether his brother can provided the assistance he needs at discharge is unknown.  Equipment Currently Used at Home: bedside commode, walker, rolling, wheelchair (tub-shower with bath stool: DME available for use)    Prior level of function:   Bed Mobility/Transfers: needs device (RW PRN)  Grooming: independent  Bathing:  "needs assist (supervision with occasional assist)  Upper Body Dressing: independent  Lower Body Dressing: independent  Toileting: independent  Home Management Skills: unable to perform  Driving License: No  Mode of Transportation: Family  Leisure and Hobbies: sitting on the porch in good weather  IADL Comments: ambulates with RW PRN, MI self-care (asist bathing PRN)     Dominant hand: ambidextrous    Subjective:  The patient was found supine in bed reporting being cold.  After being warmed with blankets, he was agreeable to the OT session.  He had difficulty understanding all verbal-sensory/manual cues, performing bed spontaneously when objects were placed in his hands.  He repeated responded "I am tired, I want to go to bed" in response to cues to assist with mobility and self-care tasks.  Extra time, rapid timing with manual assist when tasks to engage participation and frequent change in task demand was needed to maximize performance during the session.    Chief Complaint: "I'm cold"  Patient/Family stated goals: none    Pain/Comfort  Pain Rating 1: 0/10  Pain Rating Post-Intervention 1: 0/10    Objective:  Patient found with: peripheral IV, telemetry    Cognitive Exam:  Oriented to: Person and Place  Follows Commands/attention: Follows one-step commands with manual assist + verbal-sensory cues  Communication: clear/fluent  Memory:  Impaired STM, Impaired LTM and Poor immediate recall  Safety awareness/insight to disability: impaired  Coping skills/emotional control: withdrawn, responsive with assist and support    Visual/perceptual:  Intact  Blind  Impaired  motor planning praxis    Physical Exam:  Postural examination/scapula alignment: Posterior pelvic tilt  Skin integrity: Visible skin intact  Edema: Severe groin    Sensation:   Intact for light touch both hands    Upper Extremity Range of Motion:  Right Upper Extremity: WFL  Left Upper Extremity: shoulder flexion-ABD 70*    Upper Extremity Strength:  Right " Upper Extremity: arm 3-/5, elbow-forearm 3/5  Left Upper Extremity: arm 3-/5, elbow-forearm 3/5    Strength: fair both hands    Fine motor coordination:   WFL for self-care    Gross motor coordination: fair static sitting balance (poor postural adjustments), endurance poor, resistance to moving to siting EOB due to fear of falling    Functional Mobility:  Bed Mobility:  Scooting/Bridging: Total Assistance, With assist of 2  Supine to Sit: Maximum Assistance  Sit to Supine: Total Assistance, With assist of 2    Transfers:   none    Functional Ambulation: none    Activities of Daily Living:  Feeding Level of Assistance: Moderate assistance (SBA drank orange juice and ate apple sauce: Max A to find objects on tray table after tactile cuing for location)  UE Dressing Level of Assistance: Total assistance (don hospital gown as robe sitting EOB-acts like someone dresses hm at home, unable to pull sleeves up arms or attempt to repostion gown placement without Total A)  LE Dressing Level of Assistance: Total assistance (don socks)  Grooming Position:  (chair postion in bed-wash hands and mouth after eating)  Grooming Level of Assistance: Stand by assistance    Balance:   Static Sit: FAIR: Maintains without assist, but unable to take any challenges   Dynamic Sit: FAIR: Cannot move trunk without losing balance  Static Stand:NT   Dynamic stand: NT    Therapeutic Activities and Exercises: (instruction)  Use of tactile cues to find objects on tray table, bed mobility, problem solving with all self-care tasks performed    AM-PAC 6 CLICK ADL  How much help from another person does this patient currently need?  1 = Unable, Total/Dependent Assistance  2 = A lot, Maximum/Moderate Assistance  3 = A little, Minimum/Contact Guard/Supervision  4 = None, Modified [place]/Independent    Putting on and taking off regular lower body clothing? : 1  Bathing (including washing, rinsing, drying)?: 1  Toileting, which includes using  "toilet, bedpan, or urinal? : 1  Putting on and taking off regular upper body clothing?: 1  Taking care of personal grooming such as brushing teeth?: 3  Eating meals?: 2  Total Score: 9    AM-PAC Raw Score CMS "G-Code Modifier Level of Impairment Assistance   6 % Total / Unable   7 - 9 CM 80 - 100% Maximal Assist   10-14 CL 60 - 80% Moderate Assist   15 - 19 CK 40 - 60% Moderate Assist   20 - 22 CJ 20 - 40% Minimal Assist   23 CI 1-20% SBA / CGA   24 CH 0% Independent/ Mod I       Patient left supine with call button in reach, bed alarm off, chair alarm on and nurse notified    Assessment:  Rahul Barrios is a 77 y.o. male with a medical diagnosis of Septic shock and presents with  impaired endurance, impaired cognition, decreased safety awareness, decreased upper extremity function, decreased lower extremity function, impaired balance, visual deficits, edema, weakness (fatigue, Apraxia, Blind) effecting his ability to perform during the session.  OT treatment is needed to maximize function while in the hospital.  .  Rehab identified problem list/impairments: Functional Performance Deficits Effecting Function:: impaired endurance, impaired cognition, decreased safety awareness, decreased upper extremity function, decreased lower extremity function, impaired balance, visual deficits, edema, weakness (fatigue, Apraxia, Blind)    Rehab potential is Fair.    Activity tolerance: Fair    Discharge recommendations: Discharge Facility/Level Of Care Needs: TBD    Barriers to discharge: Barriers to Discharge: Inaccessible home environment, Decreased caregiver support    Equipment recommendations: none     GOALS:    Occupational Therapy Goals        Problem: Occupational Therapy Goal    Goal Priority Disciplines Outcome Interventions   Occupational Therapy Goal     OT, PT/OT Ongoing (interventions implemented as appropriate)    Description:  Goals to be met by 8/22/17  1. Max A UBD  2. Retrieve food from plate with " verba/sensory cures 50%  3. Mod A supine-sit EOB  4. Assess bed-chair/BSC transfers                    PLAN:  Patient to be seen 6 x/week to address the above listed problems via self-care/home management, therapeutic exercises, therapeutic activities, cognitive retraining, neuromuscular re-education, sensory integration  Plan of Care expires: 08/22/17  Plan of Care reviewed with: patient         MARISABEL Santillan  07/22/2017

## 2017-07-22 NOTE — PT/OT/SLP PROGRESS
Physical Therapy      Rahul Barrios  MRN: [id number]    Patient not seen today secondary to pt in dialysis. Will follow-up on Monday.    Rizwana Garcia, PTA

## 2017-07-22 NOTE — PLAN OF CARE
Problem: Occupational Therapy Goal  Goal: Occupational Therapy Goal  Goals to be met by 8/22/17  1. Max A UBD  2. Retrieve food from plate with verba/sensory cures 50%  3. Mod A supine-sit EOB  4. Assess bed-chair/BSC transfers  Outcome: Ongoing (interventions implemented as appropriate)  OT evaluation completed with treatment initiated.  Recommend In-Pt Rehabilitation at discharge.  DME: None.  MARISABEL Santillan 7/22/2017

## 2017-07-22 NOTE — SUBJECTIVE & OBJECTIVE
Past Medical History:   Diagnosis Date    Abnormal blood electrolyte level 06/28/2016    Anemia in chr kidney dis     Dependence on renal dialysis 06/03/2014    Diabetes mellitus     Hyperlipidemia     Hypertension     Infection and inflammatory reaction due to cardiac device, implant, and graft 10/26/2011    Secondary hyperparathyroidism of renal origin 08/15/2011    Unspecified protein-calorie malnutrition 06/18/2013    Vitamin D deficiency 06/28/2016       Past Surgical History:   Procedure Laterality Date    VASCULAR SURGERY         Review of patient's allergies indicates:  No Known Allergies    Current Neurological Medications: None    No current facility-administered medications on file prior to encounter.      Current Outpatient Prescriptions on File Prior to Encounter   Medication Sig    cinacalcet (SENSIPAR) 30 MG Tab Take 30 mg by mouth before dinner.    losartan (COZAAR) 50 MG tablet Take 50 mg by mouth once daily.    metoprolol succinate (TOPROL-XL) 50 MG 24 hr tablet Take 25 mg by mouth 2 (two) times daily.    simvastatin (ZOCOR) 40 MG tablet Take 40 mg by mouth every evening.    castor oil liquid Take by mouth every Tues, Thurs, Sat.    insulin aspart protamine-insulin aspart (NOVOLOG 70/30) 100 unit/mL (70-30) InPn pen Inject into the skin 2 (two) times daily before meals.    sevelamer carbonate (RENVELA) 800 mg Tab Take 800 mg by mouth 3 (three) times daily with meals.    vitamin renal formula, B-complex-vitamin c-folic acid, (NEPHROCAPS) 1 mg Cap Take 1 capsule by mouth once daily.     Family History     None        Social History Main Topics    Smoking status: Never Smoker    Smokeless tobacco: Never Used    Alcohol use No    Drug use: No    Sexual activity: No     Review of Systems   Constitutional: Negative for chills and fever.   Respiratory: Negative for shortness of breath.    Cardiovascular: Negative for chest pain.   Gastrointestinal: Negative for abdominal pain,  constipation, diarrhea, nausea and vomiting.     Objective:     Vital Signs (Most Recent):  Temp: 98 °F (36.7 °C) (07/22/17 0830)  Pulse: 72 (07/22/17 1015)  Resp: (!) 6 (07/22/17 1002)  BP: (!) 123/58 (07/22/17 1015)  SpO2: 95 % (07/22/17 1002) Vital Signs (24h Range):  Temp:  [98 °F (36.7 °C)-99.2 °F (37.3 °C)] 98 °F (36.7 °C)  Pulse:  [63-80] 72  Resp:  [6-24] 6  SpO2:  [93 %-98 %] 95 %  BP: (100-155)/(50-70) 123/58     Weight: 91.5 kg (201 lb 11.5 oz)  Body mass index is 29.79 kg/m².    Physical Exam   Constitutional: He appears well-developed and well-nourished. No distress.   HENT:   Head: Normocephalic and atraumatic.   Nose: Nose normal.   Mouth/Throat: Oropharynx is clear and moist. No oropharyngeal exudate.   Eyes: Conjunctivae are normal. Right eye exhibits no discharge. Left eye exhibits no discharge. No scleral icterus.   Neck: Normal range of motion. Neck supple. No JVD present. No tracheal deviation present. No thyromegaly present.   Cardiovascular: Normal rate, regular rhythm, normal heart sounds and intact distal pulses.  Exam reveals no gallop and no friction rub.    No murmur heard.  Pulmonary/Chest: Effort normal and breath sounds normal. No stridor. No respiratory distress. He has no wheezes. He has no rales. He exhibits no tenderness.   Abdominal: Soft. Bowel sounds are normal. He exhibits no distension and no mass. There is no tenderness. There is no rebound and no guarding.   Musculoskeletal: Normal range of motion. He exhibits no tenderness.   Left arm swelling improving with good thrill.   Lymphadenopathy:     He has no cervical adenopathy.   Neurological: He is alert. He has normal reflexes. He displays normal reflexes. No cranial nerve deficit. He exhibits normal muscle tone. Coordination normal.   Improving mental status.   Skin: Skin is warm and dry. No rash noted. He is not diaphoretic. No erythema. No pallor.   Psychiatric: He has a normal mood and affect. His behavior is normal.  Judgment and thought content normal.            Significant Labs: All pertinent lab results from the past 24 hours have been reviewed.    Significant Imaging: I have reviewed all pertinent imaging results/findings within the past 24 hours.

## 2017-07-22 NOTE — ASSESSMENT & PLAN NOTE
Secondary to aspiration pneumonia.  Improving.  Cultures negative.  Antibiotics de-escalated on 7/21/2017.  He continues to improve with moxifloxacin, will continue.

## 2017-07-22 NOTE — PROGRESS NOTES
[facility] Hospital Medicine  Progress Note    Patient Name: Rahul Barrios  MRN: [id number]  Patient Class: IP- Inpatient   Admission Date: 7/17/2017  Length of Stay: 5 days  Attending Physician: Kenny Jansen MD  Primary Care Provider: Herb Maldonado MD        Subjective:     Principal Problem:Septic shock    HPI:  See admission history and physical exam note by Dr. Cordell Heredia.    Hospital Course:  Patient admitted to the intensive care unit and started in heparin infusion to treat acute pulmonary embolus.  Patient's blood pressure improved after he was intubated and never required vasopressor support.  Patient's mental status improved with dialysis and empiric broad-spectrum antibiotics.  Patient successful extubated in the morning on 7/19/2017.  Mental status improving with treatment of pneumonia and further dialysis.    Past Medical History:   Diagnosis Date    Abnormal blood electrolyte level 06/28/2016    Anemia in chr kidney dis     Dependence on renal dialysis 06/03/2014    Diabetes mellitus     Hyperlipidemia     Hypertension     Infection and inflammatory reaction due to cardiac device, implant, and graft 10/26/2011    Secondary hyperparathyroidism of renal origin 08/15/2011    Unspecified protein-calorie malnutrition 06/18/2013    Vitamin D deficiency 06/28/2016       Past Surgical History:   Procedure Laterality Date    VASCULAR SURGERY         Review of patient's allergies indicates:  No Known Allergies    Current Neurological Medications: None    No current facility-administered medications on file prior to encounter.      Current Outpatient Prescriptions on File Prior to Encounter   Medication Sig    cinacalcet (SENSIPAR) 30 MG Tab Take 30 mg by mouth before dinner.    losartan (COZAAR) 50 MG tablet Take 50 mg by mouth once daily.    metoprolol succinate (TOPROL-XL) 50 MG 24 hr tablet Take 25 mg by mouth 2 (two) times daily.    simvastatin (ZOCOR) 40 MG tablet  Take 40 mg by mouth every evening.    castor oil liquid Take by mouth every Tues, Thurs, Sat.    insulin aspart protamine-insulin aspart (NOVOLOG 70/30) 100 unit/mL (70-30) InPn pen Inject into the skin 2 (two) times daily before meals.    sevelamer carbonate (RENVELA) 800 mg Tab Take 800 mg by mouth 3 (three) times daily with meals.    vitamin renal formula, B-complex-vitamin c-folic acid, (NEPHROCAPS) 1 mg Cap Take 1 capsule by mouth once daily.     Family History     None        Social History Main Topics    Smoking status: Never Smoker    Smokeless tobacco: Never Used    Alcohol use No    Drug use: No    Sexual activity: No     Review of Systems   Constitutional: Negative for chills and fever.   Respiratory: Negative for shortness of breath.    Cardiovascular: Negative for chest pain.   Gastrointestinal: Negative for abdominal pain, constipation, diarrhea, nausea and vomiting.     Objective:     Vital Signs (Most Recent):  Temp: 98 °F (36.7 °C) (07/22/17 0830)  Pulse: 72 (07/22/17 1015)  Resp: (!) 6 (07/22/17 1002)  BP: (!) 123/58 (07/22/17 1015)  SpO2: 95 % (07/22/17 1002) Vital Signs (24h Range):  Temp:  [98 °F (36.7 °C)-99.2 °F (37.3 °C)] 98 °F (36.7 °C)  Pulse:  [63-80] 72  Resp:  [6-24] 6  SpO2:  [93 %-98 %] 95 %  BP: (100-155)/(50-70) 123/58     Weight: 91.5 kg (201 lb 11.5 oz)  Body mass index is 29.79 kg/m².    Physical Exam   Constitutional: He appears well-developed and well-nourished. No distress.   HENT:   Head: Normocephalic and atraumatic.   Nose: Nose normal.   Mouth/Throat: Oropharynx is clear and moist. No oropharyngeal exudate.   Eyes: Conjunctivae are normal. Right eye exhibits no discharge. Left eye exhibits no discharge. No scleral icterus.   Neck: Normal range of motion. Neck supple. No JVD present. No tracheal deviation present. No thyromegaly present.   Cardiovascular: Normal rate, regular rhythm, normal heart sounds and intact distal pulses.  Exam reveals no gallop and no  friction rub.    No murmur heard.  Pulmonary/Chest: Effort normal and breath sounds normal. No stridor. No respiratory distress. He has no wheezes. He has no rales. He exhibits no tenderness.   Abdominal: Soft. Bowel sounds are normal. He exhibits no distension and no mass. There is no tenderness. There is no rebound and no guarding.   Musculoskeletal: Normal range of motion. He exhibits no tenderness.   Left arm swelling improving with good thrill.   Lymphadenopathy:     He has no cervical adenopathy.   Neurological: He is alert. He has normal reflexes. He displays normal reflexes. No cranial nerve deficit. He exhibits normal muscle tone. Coordination normal.   Improving mental status.   Skin: Skin is warm and dry. No rash noted. He is not diaphoretic. No erythema. No pallor.   Psychiatric: He has a normal mood and affect. His behavior is normal. Judgment and thought content normal.            Significant Labs: All pertinent lab results from the past 24 hours have been reviewed.    Significant Imaging: I have reviewed all pertinent imaging results/findings within the past 24 hours.    Assessment/Plan:      Septic shock    Secondary to aspiration pneumonia.  Improving.  Cultures negative.  Antibiotics de-escalated on 7/21/2017.  He continues to improve with moxifloxacin, will continue.        Acute respiratory failure with hypoxia    Hypoxia secondary to aspiration pneumonia based on imaging and elevated procalcitonin.  Status post extubated on 7/19/2017. Patient doing well off ventilator.  No longer hypoxic.  Cultures negative and doing well on antibiotic treatment.        Diabetic ketoacidosis without coma associated with type 2 diabetes mellitus    Resolved.  Increased anion gap at this time mostly due to inadequate dialysis likely due to issues with his current access and improving with dialysis.        Anemia in chronic kidney disease              ESRD on dialysis    Patient received dialysis via current access  with improvement.  Plan to continue with dialysis.        Acute pulmonary embolism    I spoke with Dr. Swati Hennessy who says that a small pulmonary embolus following de-clotting procedure is expected and these patients are not typically treated with anticoagulation. Echocardiogram does not show evidence of right-sided heart strain and he is not hypoxic.  I suspect acute worsening mental status more related to sedation from procedure, uremia from inadequate dialysis due to nonfunctioning access, and also aspiration pneumonia. Will discontinue anticoagulation at this time and continue to monitor.        Metabolic acidosis    I suspect mostly due to inadequate dialysis.  Plan for further dialysis.        Type 2 diabetes mellitus with complication, with long-term current use of insulin               Anemia due to pre-ESRD treated with erythropoietin                VTE Risk Mitigation         Ordered     Medium Risk of VTE  Once      07/18/17 0037          Kenny Jansen MD  Department of Hospital Medicine   [facility]

## 2017-07-22 NOTE — ASSESSMENT & PLAN NOTE
Resolved.  Increased anion gap at this time mostly due to inadequate dialysis likely due to issues with his current access and improving with dialysis.

## 2017-07-22 NOTE — NURSING
Patient transferred from ICU to Med/Surg via LANG Perez. Telemetry monitor placed per order. Assessment performed. Vital signs stable. Bed locked and in low position, bed alarm on. Instructed patient on how to use the call light by feeling due to vision impairment. Patient verbalized and demonstrated an understanding. Will continue to monitor.

## 2017-07-22 NOTE — NURSING
Patient resting in bed. O2 sats 94% on room air. Patient's neuro status intact, with moments of confusion. Pt's strength appears to be increasing; pt able to assist with bed bath and turn self in bed. Pt's bowels sounds are active, although pt is concerned he has not had a BM in a few days.

## 2017-07-22 NOTE — PT/OT/SLP PROGRESS
Occupational Therapy  Not seen Note    Rahul Barrios  MRN: [id number]    Patient not seen today secondary to Dialysis. Will follow-up later in the day as time and patient availability permit.    MARISABEL Santillan  7/22/2017

## 2017-07-22 NOTE — PT/OT/SLP PROGRESS
"Physical Therapy  Treatment    Rahul Barrios   MRN: [id number]   Admitting Diagnosis: Septic shock    PT Received On: 07/22/17  PT Start Time: 1438     PT Stop Time: 1502    PT Total Time (min): 24 min       Billable Minutes:  Therapeutic Activity 14 and Therapeutic Exercise 10    Treatment Type: Treatment  PT/PTA: PTA     PTA Visit Number: 1       General Precautions: Standard, fall  Orthopedic Precautions: N/A   Braces: N/A    Do you have any cultural, spiritual, Tenriism conflicts, given your current situation?: None specified    Subjective:  Communicated with ns prior to session.  Pt agreeable to tx., OT present upon arrival and assisting pt to EOB.   Pt stating "I'm tired" once sitting up EOB, and asking to lie back down.    Pain/Comfort  Pain Rating 1:  (pt c/o testicular pain when sitting up (noted to be swollen), pt did not rate)  Pain Addressed 1: Reposition    Objective:   Patient found with: peripheral IV, telemetry    Functional Mobility:  Bed Mobility:   Supine to Sit:  (perf'd by OT)    Transfers:  Sit <> Stand Assistance: Minimum Assistance, Moderate Assistance (Aurelia x 2 , (modA x 1))  Sit <> Stand Assistive Device: Rolling Walker    Gait:   Gait Distance: pt stood only w/ RW and min/modA ~:30 -:45 sec x 2 trials, unable to take side steps today, c/o fatigue  Assistance 1: Minimum assistance, Moderate assistance  Gait Assistive Device: Rolling walker    Stairs:  n/a    Balance:   Static Sit: FAIR-: Maintains without assist but inconsistent   Dynamic Sit: FAIR: Cannot move trunk without losing balance  Static Stand: POOR: Needs MODERATE assist to maintain  Dynamic stand: POOR: N/A     Therapeutic Activities and Exercises:  Pt sat up EOB ~15 min., worked on sitting balance (pt leaning posteriorly) and scooting act's, stood 2x's w/ RW and min/modA. Perf'd supine LE ex's of AP's, heelslides, hip abd/add x 10 ea.     AM-PAC 6 CLICK MOBILITY  How much help from another person does this patient currently " need?   1 = Unable, Total/Dependent Assistance  2 = A lot, Maximum/Moderate Assistance  3 = A little, Minimum/Contact Guard/Supervision  4 = None, Modified [place]/Independent    Turning over in bed (including adjusting bedclothes, sheets and blankets)?: 2  Sitting down on and standing up from a chair with arms (e.g., wheelchair, bedside commode, etc.): 2  Moving from lying on back to sitting on the side of the bed?: 2  Moving to and from a bed to a chair (including a wheelchair)?: 1  Need to walk in hospital room?: 1  Climbing 3-5 steps with a railing?: 1  Total Score: 9    AM-PAC Raw Score CMS G-Code Modifier Level of Impairment Assistance   6 % Total / Unable   7 - 9 CM 80 - 100% Maximal Assist   10 - 14 CL 60 - 80% Moderate Assist   15 - 19 CK 40 - 60% Moderate Assist   20 - 22 CJ 20 - 40% Minimal Assist   23 CI 1-20% SBA / CGA   24 CH 0% Independent/ Mod I     Patient left HOB elevated with all lines intact, call button in reach and ns notified.    Assessment:  Rahul Barrios is a 77 y.o. male with a medical diagnosis of Septic shock and presents with dec mobility, pt seems scared of falling, needs encouragement and reassurance.    Rehab identified problem list/impairments: Rehab identified problem list/impairments: weakness, impaired endurance, impaired self care skills, impaired functional mobilty, gait instability, impaired balance, edema, decreased lower extremity function, decreased safety awareness, visual deficits    Rehab potential is good.    Activity tolerance: Good    Discharge recommendations: Discharge Facility/Level Of Care Needs: rehabilitation facility     Barriers to discharge: Barriers to Discharge: Inaccessible home environment, Decreased caregiver support    Equipment recommendations: Equipment Needed After Discharge: 3-in-1 commode     GOALS:    Physical Therapy Goals        Problem: Physical Therapy Goal    Goal Priority Disciplines Outcome Goal Variances Interventions    Physical Therapy Goal     PT/OT, PT Ongoing (interventions implemented as appropriate)     Description:  Goals to be met by: 17     Patient will increase functional independence with mobility by performin. Supine to sit with modified independence.   2. Sit to supine with modified independence.   3. Sit<>stand transfer with modified independence using rolling walker.   4. Gait x 50 feet with modified independence using rolling walker.                     PLAN:    Patient to be seen daily  to address the above listed problems via gait training, therapeutic activities, therapeutic exercises, neuromuscular re-education  Plan of Care expires: 17  Plan of Care reviewed with: patient         Rizwana Garcia, PTA  2017

## 2017-07-22 NOTE — PLAN OF CARE
Problem: Physical Therapy Goal  Goal: Physical Therapy Goal  Goals to be met by: 17     Patient will increase functional independence with mobility by performin. Supine to sit with modified independence.   2. Sit to supine with modified independence.   3. Sit<>stand transfer with modified independence using rolling walker.   4. Gait x 50 feet with modified independence using rolling walker.    Pt progressing towards goals, sit to stand w/ RW and min/modA, stood ~:30-:45 sec x 2 trials. Pt unable to take steps today, c/o fatigue (pt did have dialysis in AM). Recommend cont PT in REHAB.

## 2017-07-22 NOTE — ASSESSMENT & PLAN NOTE
Hypoxia secondary to aspiration pneumonia based on imaging and elevated procalcitonin.  Status post extubated on 7/19/2017. Patient doing well off ventilator.  No longer hypoxic.  Cultures negative and doing well on antibiotic treatment.

## 2017-07-22 NOTE — PLAN OF CARE
Problem: Patient Care Overview  Goal: Plan of Care Review  Outcome: Ongoing (interventions implemented as appropriate)  Patient free from falls, injury or skin breakdown this shift. Vital signs stable on room air. Repositions per 2 person assist. No complaints this shift. On telemetry. Bed locked and low position. Call light within reach. Brother at bedside.

## 2017-07-23 VITALS
OXYGEN SATURATION: 97 % | WEIGHT: 201.06 LBS | HEART RATE: 74 BPM | BODY MASS INDEX: 29.78 KG/M2 | RESPIRATION RATE: 16 BRPM | SYSTOLIC BLOOD PRESSURE: 115 MMHG | HEIGHT: 69 IN | DIASTOLIC BLOOD PRESSURE: 57 MMHG | TEMPERATURE: 99 F

## 2017-07-23 LAB
ANION GAP SERPL CALC-SCNC: 15 MMOL/L
BACTERIA BLD CULT: NORMAL
BACTERIA BLD CULT: NORMAL
BASOPHILS # BLD AUTO: 0.03 K/UL
BASOPHILS NFR BLD: 0.4 %
BUN SERPL-MCNC: 43 MG/DL
CALCIUM SERPL-MCNC: 8.6 MG/DL
CHLORIDE SERPL-SCNC: 97 MMOL/L
CO2 SERPL-SCNC: 25 MMOL/L
CREAT SERPL-MCNC: 7.8 MG/DL
DIFFERENTIAL METHOD: ABNORMAL
EOSINOPHIL # BLD AUTO: 0.2 K/UL
EOSINOPHIL NFR BLD: 2.2 %
ERYTHROCYTE [DISTWIDTH] IN BLOOD BY AUTOMATED COUNT: 14.4 %
EST. GFR  (AFRICAN AMERICAN): 7 ML/MIN/1.73 M^2
EST. GFR  (NON AFRICAN AMERICAN): 6 ML/MIN/1.73 M^2
GLUCOSE SERPL-MCNC: 183 MG/DL
HCT VFR BLD AUTO: 25.2 %
HGB BLD-MCNC: 8 G/DL
LYMPHOCYTES # BLD AUTO: 1 K/UL
LYMPHOCYTES NFR BLD: 13.3 %
MAGNESIUM SERPL-MCNC: 1.8 MG/DL
MCH RBC QN AUTO: 31.7 PG
MCHC RBC AUTO-ENTMCNC: 31.7 G/DL
MCV RBC AUTO: 100 FL
MONOCYTES # BLD AUTO: 1.1 K/UL
MONOCYTES NFR BLD: 14.6 %
NEUTROPHILS # BLD AUTO: 5.4 K/UL
NEUTROPHILS NFR BLD: 68.7 %
PHOSPHATE SERPL-MCNC: 5.4 MG/DL
PLATELET # BLD AUTO: 200 K/UL
PMV BLD AUTO: 11.1 FL
POCT GLUCOSE: 139 MG/DL (ref 70–110)
POCT GLUCOSE: 208 MG/DL (ref 70–110)
POTASSIUM SERPL-SCNC: 4.2 MMOL/L
RBC # BLD AUTO: 2.52 M/UL
SODIUM SERPL-SCNC: 137 MMOL/L
VANCOMYCIN SERPL-MCNC: 10.3 UG/ML
WBC # BLD AUTO: 7.83 K/UL

## 2017-07-23 PROCEDURE — 97535 SELF CARE MNGMENT TRAINING: CPT

## 2017-07-23 PROCEDURE — 83735 ASSAY OF MAGNESIUM: CPT

## 2017-07-23 PROCEDURE — 80048 BASIC METABOLIC PNL TOTAL CA: CPT

## 2017-07-23 PROCEDURE — 99239 HOSP IP/OBS DSCHRG MGMT >30: CPT | Mod: ,,, | Performed by: HOSPITALIST

## 2017-07-23 PROCEDURE — 84100 ASSAY OF PHOSPHORUS: CPT

## 2017-07-23 PROCEDURE — 25000003 PHARM REV CODE 250: Performed by: HOSPITALIST

## 2017-07-23 PROCEDURE — 85025 COMPLETE CBC W/AUTO DIFF WBC: CPT

## 2017-07-23 PROCEDURE — 25000003 PHARM REV CODE 250: Performed by: INTERNAL MEDICINE

## 2017-07-23 PROCEDURE — 92526 ORAL FUNCTION THERAPY: CPT

## 2017-07-23 PROCEDURE — 80202 ASSAY OF VANCOMYCIN: CPT

## 2017-07-23 RX ORDER — MOXIFLOXACIN HYDROCHLORIDE 400 MG/1
400 TABLET ORAL DAILY
Qty: 2 TABLET | Refills: 0 | Status: SHIPPED | OUTPATIENT
Start: 2017-07-24 | End: 2017-07-26

## 2017-07-23 RX ORDER — INSULIN LISPRO 100 [IU]/ML
2 INJECTION, SOLUTION INTRAVENOUS; SUBCUTANEOUS
Start: 2017-07-23

## 2017-07-23 RX ORDER — SALICYLIC ACID
15 POWDER (GRAM) MISCELLANEOUS DAILY PRN
Refills: 12 | COMMUNITY
Start: 2017-07-23

## 2017-07-23 RX ORDER — HEPARIN SODIUM 5000 [USP'U]/ML
5000 INJECTION, SOLUTION INTRAVENOUS; SUBCUTANEOUS EVERY 8 HOURS
Status: DISCONTINUED | OUTPATIENT
Start: 2017-07-23 | End: 2017-07-23 | Stop reason: HOSPADM

## 2017-07-23 RX ADMIN — INSULIN ASPART 4 UNITS: 100 INJECTION, SOLUTION INTRAVENOUS; SUBCUTANEOUS at 09:07

## 2017-07-23 RX ADMIN — HEPARIN SODIUM 5000 UNITS: 5000 INJECTION, SOLUTION INTRAVENOUS; SUBCUTANEOUS at 01:07

## 2017-07-23 RX ADMIN — INSULIN ASPART 3 UNITS: 100 INJECTION, SOLUTION INTRAVENOUS; SUBCUTANEOUS at 09:07

## 2017-07-23 RX ADMIN — MOXIFLOXACIN HYDROCHLORIDE 400 MG: 400 TABLET, FILM COATED ORAL at 08:07

## 2017-07-23 RX ADMIN — ASPIRIN 81 MG: 81 TABLET, COATED ORAL at 08:07

## 2017-07-23 RX ADMIN — SEVELAMER CARBONATE 800 MG: 800 TABLET, FILM COATED ORAL at 01:07

## 2017-07-23 RX ADMIN — INSULIN ASPART 3 UNITS: 100 INJECTION, SOLUTION INTRAVENOUS; SUBCUTANEOUS at 01:07

## 2017-07-23 RX ADMIN — SEVELAMER CARBONATE 800 MG: 800 TABLET, FILM COATED ORAL at 08:07

## 2017-07-23 NOTE — PLAN OF CARE
Problem: Occupational Therapy Goal  Goal: Occupational Therapy Goal  Goals to be met by 8/22/17  1. Max A UBD  2. Retrieve food from plate with verba/sensory cures 50% (MET 7/23/17)      REVISED GOAL: Use Left hand on plate (spatial cue) 90%  with Right hand eating with Min A for food placement  3. Mod A supine-sit EOB  4. Assess bed-chair/BSC transfers   Outcome: Ongoing (interventions implemented as appropriate)  The patient was seen for self-feeding this morning.  He worked on blind bilateral hand technique for scooping food, cup placement and finding objects on the breakfast tray.   There was a need to omit the straw and take small sips to avoid coughing after swallowing his coffee.  Using sensory cues to guide performance was new to the patient with the patient exhibiting limited short term memory for learning at the end of the session.  MARISABEL Santillan 7/23/2017

## 2017-07-23 NOTE — NURSING
Discharge instructions reviewed with patient and brother. All questions answered and patient and brother verbalized an understanding. IV and heart monitor removed. Patient to be discharged to home with brother with orders for home health via wheelchair.

## 2017-07-23 NOTE — DISCHARGE SUMMARY
[facility] Hospital Medicine  Discharge Summary      Patient Name: Rahul Barrios  MRN: [id number]  Admission Date: 7/17/2017  Hospital Length of Stay: 6 days  Discharge Date and Time:  07/23/2017 10:13 AM  Attending Physician: Kenny Jansen MD   Discharging Provider: Kenny Jansen MD  Primary Care Provider: Herb Maldonado MD      HPI:   See admission history and physical exam note by Dr. Cordell Heredia.    Procedure(s) (LRB):  ULTRASOUND (Left)      Indwelling Lines/Drains at time of discharge:   Lines/Drains/Airways     Drain                 Hemodialysis AV Fistula 06123 days         Hemodialysis AV Fistula Left upper arm 74264 days              Hospital Course:   Patient intubated in the emergency department and admitted to the intensive care unit.  CT scan of the chest revealed bibasilar consolidation with air bronchograms, left greater than right and also small pulmonary embolus following recent de-clot procedure the day prior to presentation.  He was treated with broad-spectrum antibiotics and also started on a heparin infusion to treat acute pulmonary embolus.  Patient's blood pressure improved after he was intubated and he but not required vasopressor support.  Patient's mental status improved with dialysis and empiric broad-spectrum antibiotics.  Patient successfully extubated in the morning on 7/19/2017.  Mental status improving with treatment of pneumonia and further dialysis.  Patient also presented with significant edema on the left upper extremity where he had his de-clot procedure.  Ultrasounds did not reveal evidence of clot.  And with dialysis his swelling improving.    I spoke with Dr. Swati Hennessy who says that a small pulmonary embolus following de-clotting procedure is expected and these patients are not typically treated with anticoagulation. Echocardiogram does not show evidence of right-sided heart strain and he was no longer hypoxic after treatment for pneumonia and  with volume removal with dialysis.  I suspect the patient's acute worsening mental status due to sedation from his recent declot procedure, uremia from inadequate dialysis due to nonfunctioning access, and also aspiration pneumonia.  Anticoagulation discontinued and he continued to do well.  Speech therapy evaluated patient and recommended measures to decrease risk of aspiration.    Patient stable to be discharged home today to complete a course of oral antibiotics to treat pneumonia and he is to resume outpatient dialysis starting tomorrow.  Patient and his brother advised to follow-up with Dr. Herb Maldonado in clinic early this upcoming week.  Insulin regimen adjusted for reasonable glycemic control with goal of avoiding hypoglycemia.  Patient's has been normotensive off prior antihypertensive regimen and I have advised to hold his home blood pressure medications until he sees Dr. Maldonado in clinic.  Lastly, I have ordered home health to help monitor his blood pressure and diabetes and also in order for patient to receive therapy in the home setting.     Consults:   Consults         Status Ordering Provider     Inpatient consult to Nephrology-[facility]  Once     Provider:  Román Lara MD    Completed PETRA HERNDON     Inpatient consult to Neurology  Once     Provider:  Raghav Solorio MD    Acknowledged PETRA HERNDON     Inpatient consult to Pulmonary Critical Care  Once     Provider:  Edmund Sandoval MD    Completed PETRA HERNDON     Inpatient consult to Social Work  Once     Provider:  (Not yet assigned)    GOPI Lundy        Final Active Diagnoses:    Diagnosis Date Noted POA    PRINCIPAL PROBLEM:  Septic shock [A41.9, R65.21] 07/19/2017 Yes    Acute respiratory failure with hypoxia [J96.01] 07/19/2017 Yes    Diabetic ketoacidosis without coma associated with type 2 diabetes mellitus [E13.10] 07/19/2017 Yes    Anemia in chronic kidney disease [N18.9, D63.1] 07/17/2017 Yes    ESRD on  dialysis [N18.6, Z99.2]  Not Applicable    Acute pulmonary embolism [I26.99]  Yes    Metabolic acidosis [E87.2] 07/17/2017 Yes    Type 2 diabetes mellitus with complication, with long-term current use of insulin [E11.8, Z79.4] 07/17/2017 Yes    Anemia due to pre-ESRD treated with erythropoietin [N03.9, D63.1] 07/19/2017 Yes    Sepsis due to pneumonia [J18.9, A41.9] 07/21/2017 Yes      Problems Resolved During this Admission:    Diagnosis Date Noted Date Resolved POA    ESRD (end stage renal disease) [N18.6]  07/19/2017 Yes    Altered mental status [R41.82] 07/17/2017 07/19/2017 Yes      Acute pulmonary embolism    I spoke with Dr. Swati Hennessy who says that a small pulmonary embolus following de-clotting procedure is expected and these patients are not typically treated with anticoagulation. Echocardiogram does not show evidence of right-sided heart strain and he is not hypoxic.  I suspect acute worsening mental status more related to sedation from procedure, uremia from inadequate dialysis due to nonfunctioning access, and also aspiration pneumonia. Will discontinue anticoagulation at this time and continue to monitor.            Discharged Condition: Stable    Disposition: [facility] Care [facility] [place] [facility]    Follow Up:  Follow-up Information     Herb Maldonado MD. Schedule an appointment as soon as possible for a visit in 1 week.    Specialty:  Nephrology  Contact information:  [address] [place] [id number]  [phone]                 Patient Instructions:     Diet renal     Diet Diabetic 1800 Calories     Activity as tolerated     Call MD for:  temperature >100.4     Call MD for:  persistent nausea and vomiting or diarrhea     Call MD for:  severe uncontrolled pain     Call MD for:  difficulty breathing or increased cough     Call MD for:  severe persistent headache     Call MD for:  worsening rash     Call MD for:  persistent dizziness, light-headedness, or visual disturbances     Call MD for:   increased confusion or weakness       Medications:  Reconciled Home Medications:   Current Discharge Medication List      START taking these medications    Details   insulin detemir (LEVEMIR FLEXTOUCH) 100 unit/mL (3 mL) SubQ InPn pen 6 units at nighttime, please dispense with 1 month of supplies.  Qty: 1.8 mL, Refills: 0      moxifloxacin (AVELOX) 400 mg tablet Take 1 tablet (400 mg total) by mouth once daily.  Qty: 2 tablet, Refills: 0         CONTINUE these medications which have CHANGED    Details   castor oil liquid Take 15 mLs by mouth daily as needed.  Refills: 12      insulin lispro (HUMALOG) 100 unit/mL injection Inject 2 Units into the skin 3 (three) times daily before meals.         CONTINUE these medications which have NOT CHANGED    Details   aspirin (ECOTRIN) 81 MG EC tablet Take 81 mg by mouth once daily.      simvastatin (ZOCOR) 40 MG tablet Take 40 mg by mouth every evening.      sevelamer carbonate (RENVELA) 800 mg Tab Take 800 mg by mouth 3 (three) times daily with meals.         STOP taking these medications       B COMPLEX W-C NO.20/FOLIC ACID (KRISTIAN CAPS ORAL) Comments:   Reason for Stopping:         calcium acetate (PHOSLO) 667 mg capsule Comments:   Reason for Stopping:         cinacalcet (SENSIPAR) 30 MG Tab Comments:   Reason for Stopping:         docusate sodium (COLACE) 100 MG capsule Comments:   Reason for Stopping:         losartan (COZAAR) 50 MG tablet Comments:   Reason for Stopping:         metoprolol succinate (TOPROL-XL) 50 MG 24 hr tablet Comments:   Reason for Stopping:         insulin aspart protamine-insulin aspart (NOVOLOG 70/30) 100 unit/mL (70-30) InPn pen Comments:   Reason for Stopping:         vitamin renal formula, B-complex-vitamin c-folic acid, (NEPHROCAPS) 1 mg Cap Comments:   Reason for Stopping:             Time spent on the discharge of patient: 35 minutes    Kenny Jansen MD  Department of Hospital Medicine  [facility]

## 2017-07-23 NOTE — PLAN OF CARE
Problem: SLP Goal  Goal: SLP Goal  1. Pt will be able to consume a dental soft diet with thin liquids with no signs of airway threat, aspiration, esophageal backflow given min assistance to follow through on compensatory strategies: small sips, small bites, slow rate of intake, alternate liquids and solids   Pt seen for dysphagia tx with lunch meal today.  Rec NO STRAWS.  Cont per ST P[facility].

## 2017-07-23 NOTE — PT/OT/SLP DISCHARGE
Occupational Therapy Discharge Summary    Rahul Barrios  MRN: [id number]   Septic shock   Patient Discharged from acute Occupational Therapy on 7/23/17.  Please refer to prior OT note dated on 7/23/17 for functional status.     Assessment:   Patient appropriate for care in another setting.  GOALS:    Occupational Therapy Goals        Problem: Occupational Therapy Goal    Goal Priority Disciplines Outcome Interventions   Occupational Therapy Goal     OT, PT/OT Ongoing (interventions implemented as appropriate)    Description:  Goals to be met by 8/22/17  1. Max A UBD  2. Retrieve food from plate with verba/sensory cures 50% (MET 7/23/17)      REVISED GOAL: Use Left hand on plate (spatial cue) 90%  with Right hand eating with Min A for food placement  3. Mod A supine-sit EOB  4. Assess bed-chair/BSC transfers                   Reasons for Discontinuation of Therapy Services  Transfer to alternate level of care.      Plan:  Patient Discharged to: Home with Home Health Service, In-Pt Rehabilitation recommended.  MARISABEL Santillan 7/23/2017  .

## 2017-07-23 NOTE — PLAN OF CARE
07/23/17 1312   Medicare Message   Important Message from Medicare regarding Discharge Appeal Rights Explained to patient/caregiver;Signed/date by patient/caregiver   Date IMM was signed 07/23/17   Time IMM was signed 0823

## 2017-07-23 NOTE — PT/OT/SLP PROGRESS
Speech Language Pathology  Treatment    Rahul Barrios   MRN: [id number]   Admitting Diagnosis: Septic shock    Diet recommendations: Solid Diet Level: Dental Soft  Liquid Diet Level: Thin Feed only when awake/alert, No straws, HOB to 90 degrees, Small bites/sips, Alternating bites/sips, 1 bite/sip at a time, Remain upright 30 minutes post meal and Assistance with meals    SLP Treatment Date: 07/23/17  Speech Start Time: 1300     Speech Stop Time: 1321     Speech Total (min): 21 min       TREATMENT BILLABLE MINUTES:  Treatment Swallowing Dysfunction 21    Has the patient been evaluated by SLP for swallowing? : Yes  Keep patient NPO?: No   General Precautions: Standard, blind, fall, aspiration          Subjective:  Pt found partially reclined in bed; no c/o pain.      Pain/Comfort  Pain Rating 1: 0/10    Objective:   Patient found with: telemetry, peripheral IV   ST and nursing appropriately elevated pt to upright, 90 degree position for safer intake.  ST educated brother at [facility] on positioning.  He verbalized understanding.  The pt was alert and responsive to ST greeting and questions.      ST observed nursing giving pt a pill with straw sips thin liquids.  Pt with audible swallow, and cough post swallow.  ST placed a cup of plain, thin water in pt's R hand; pt able to hold cup and slowly bring to mouth to take sip.  Occasional tactile cues needed.  The pt consumed three cup sips.  Audible swallow noted; mild decrease in hyolaryngeal excursion palpated.  No overt s/s airway threat observed with small cup sips initiated by pt.  Voice remained clear.     The pt's brother initiated to feed pt, as lunch tray was present at [facility].  ST educated him on safe swallowing strategies.  The pt was observed with prolonged oral prep of solids, likely 2' poor/missing dentition, with occasional pocketing observed on L side.  ST encouraged allowing pt to initiate sip from cup between bites presented to improve oral clearance, checking L  side for pocketed masticated solids, and ensuring swallow/oral clearance prior to giving next bite.  No overt coughing observed when these precautions were implemented appropriately.   The pt will require an extended time for meals due to prolonged oral prep, cues needed to clear, alternating sips/bites.  Recommend ST continue to follow pt and perhaps work with pt near end of meal to observe if increase noted in cough possibly related to esophageal s/s.  The pt should remain in fully upright position for all intake and 30 mins post meals.                                   Assessment:  Rahul Barrios is a 77 y.o. male with a medical diagnosis of Septic shock and presents with mild oral and pharyngeal dysphagia, with cough noted immediately post straw sips thin liquids.  Safe swallowing precautions appear to reduce s/s airway threat when appropriately implemented.  Rec ST to cont to follow pt at end of meal to determine if further esophageal s/s noted and further need for objective instrumental assessment.     Discharge recommendations:       Goals:    SLP Goals        Problem: SLP Goal    Goal Priority Disciplines Outcome   SLP Goal     SLP Ongoing (interventions implemented as appropriate)   Description:  1. Pt will be able to consume a dental soft diet with thin liquids with no signs of airway threat, aspiration, esophageal backflow given min assistance to follow through on compensatory strategies: small sips, small bites, slow rate of intake, alternate liquids and solids                     Plan:   Patient to be seen Therapy Frequency: 5 x/week   Plan of Care expires: 08/22/17  Plan of Care reviewed with: patient, sibling  SLP Follow-up?: Yes              Fe Piña CCC-SLP  07/23/2017

## 2017-07-23 NOTE — PROGRESS NOTES
AN acknowledged  order and met with pt and brother, Massimo, would like to be placed with first available provider and brother signed choice form (pt is blind).  AN called [facility] -[phone], spoke to Shefali and gave ROSA GALINDO, Name and insurance; she stated will retrieve orders from [facility], but will visit pt tomorrow.  AN also sent  order, hp and face sheet to [facility] via [facility].

## 2017-07-23 NOTE — PLAN OF CARE
[facility]    HOME HEALTH ORDERS  FACE TO FACE ENCOUNTER    Patient Name: Rahul Barrios  YOB: 1940    PCP: Herb Maldonado MD   PCP Address: [address] [place] [address] / [place] [id number]  PCP Phone Number: [phone]  PCP Fax: [phone]    Encounter Date: 07/23/2017    Admit to Home Health    Diagnoses:  Active Hospital Problems    Diagnosis  POA    *Septic shock [A41.9, R65.21]  Yes     Priority: 1 - High    Acute respiratory failure with hypoxia [J96.01]  Yes     Priority: 2     Diabetic ketoacidosis without coma associated with type 2 diabetes mellitus [E13.10]  Yes     Priority: 4     Anemia in chronic kidney disease [N18.9, D63.1]  Yes     Priority: 4     ESRD on dialysis [N18.6, Z99.2]  Not Applicable     Priority: 5     Acute pulmonary embolism [I26.99]  Yes     Priority: 7     Metabolic acidosis [E87.2]  Yes     Priority: 7     Type 2 diabetes mellitus with complication, with long-term current use of insulin [E11.8, Z79.4]  Yes     Priority: 8     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  Follow-up Information     Herb Maldonado MD. Schedule an appointment as soon as possible for a visit in 1 week.    Specialty:  Nephrology  Contact information:  [address] [place] [address] [phone]                     I have seen and examined this patient face to face today. My clinical findings that support the need for the home health skilled services and home bound status are the following:  Weakness/numbness causing balance and gait disturbance due to Infection and Weakness/Debility making it taxing to leave home.  Patient with  medication mismanagement issues requiring home bound status as evidenced by  Poor understanding of medication regimen/dosage.    Allergies:Review of patient's allergies indicates:  No Known Allergies    Diet: Diabetic renal diet. Solid Diet Level: Dental Soft  Liquid Diet Level: Thin Feed only when awake/alert, HOB to 90 degrees, Small bites/sips, Alternating bites/sips, 1 bite/sip at a time, Remain upright 30 minutes post meal and Assistance with meals    Activities: activity as tolerated    Nursing:   SN to complete comprehensive assessment including routine vital signs. Instruct on disease process and s/s of complications to report to MD. Review/verify medication list sent home with the patient at time of discharge  and instruct patient/caregiver as needed. Frequency may be adjusted depending on start of care date.    Notify MD if SBP > 160 or < 90; DBP > 90 or < 50; HR > 120 or < 50; Temp > 101;       CONSULTS:    Physical Therapy to evaluate and treat. Evaluate for home safety and equipment needs; Establish/upgrade home exercise program. Perform / instruct on therapeutic exercises, gait training, transfer training, and Range of Motion.  Occupational Therapy to evaluate and treat. Evaluate home environment for safety and equipment needs. Perform/Instruct on transfers, ADL training, ROM, and therapeutic exercises.  Speech Therapy  to evaluate and treat for  Swallowing.   to evaluate for community resources/long-range planning.  Aide to provide assistance with personal care, ADLs, and vital signs.    MISCELLANEOUS CARE:  Diabetic Care:   SN to perform and educate Diabetic management with blood glucose monitoring:, Fingerstick blood sugar AC and HS and Report CBG < 60 or > 350 to physician.    Medications: Review discharge medications with patient and family and provide education.      Current Discharge Medication List      START taking these medications    Details   insulin detemir (LEVEMIR FLEXTOUCH)  100 unit/mL (3 mL) SubQ InPn pen 6 units at nighttime, please dispense with 1 month of supplies.  Qty: 1.8 mL, Refills: 0      moxifloxacin (AVELOX) 400 mg tablet Take 1 tablet (400 mg total) by mouth once daily.  Qty: 2 tablet, Refills: 0  Next dose tomorrow 7/24/2017.  Last dose 7/25/2017.         CONTINUE these medications which have CHANGED    Details   castor oil liquid Take 15 mLs by mouth daily as needed for constipation.  Refills: 12      insulin lispro (HUMALOG) 100 unit/mL injection Inject 2 Units into the skin 3 (three) times daily before meals.         CONTINUE these medications which have NOT CHANGED    Details   aspirin (ECOTRIN) 81 MG EC tablet Take 81 mg by mouth once daily.      simvastatin (ZOCOR) 40 MG tablet Take 40 mg by mouth every evening.      sevelamer carbonate (RENVELA) 800 mg Tab Take 800 mg by mouth 3 (three) times daily with meals.         STOP taking these medications       B COMPLEX W-C NO.20/FOLIC ACID (KRISTIAN CAPS ORAL) Comments:   Reason for Stopping:         calcium acetate (PHOSLO) 667 mg capsule Comments:   Reason for Stopping:         cinacalcet (SENSIPAR) 30 MG Tab Comments:   Reason for Stopping:         docusate sodium (COLACE) 100 MG capsule Comments:   Reason for Stopping:         losartan (COZAAR) 50 MG tablet Comments:   Reason for Stopping:         metoprolol succinate (TOPROL-XL) 50 MG 24 hr tablet Comments:   Reason for Stopping:         insulin aspart protamine-insulin aspart (NOVOLOG 70/30) 100 unit/mL (70-30) InPn pen Comments:   Reason for Stopping:         vitamin renal formula, B-complex-vitamin c-folic acid, (NEPHROCAPS) 1 mg Cap Comments:   Reason for Stopping:               I certify that this patient is confined to his home and needs intermittent skilled nursing care, physical therapy, speech therapy and occupational therapy.

## 2017-07-23 NOTE — PT/OT/SLP PROGRESS
Occupational Therapy  Treatment    Rahul Barrios   MRN: [id number]   Admitting Diagnosis: Septic shock    OT Date of Treatment: 07/23/17   OT Start Time: 0900  OT Stop Time: 0943  OT Total Time (min): 43 min    Billable Minutes:  Self Care/Home Management 43    General Precautions: Standard, fall, blind, aspiration  Orthopedic Precautions: N/A  Braces: N/A    Do you have any cultural, spiritual, Sikhism conflicts, given your current situation?: None    Subjective:  The patient was found supine in bed anxious to eat his breakfast.  He was agreeable to the OT session, surprised that there were special ways that blind people could learn to perform tasks without assist of others     Pain/Comfort  Pain Rating 1: 0/10  Pain Rating Post-Intervention 1: 0/10    Objective:  Patient found with: telemetry, peripheral IV     Functional Mobility:  Bed Mobility:   none    Transfers:    none    Functional Ambulation: none    Activities of Daily Living:    Feeding Level of Assistance: Moderate assistance (Poor memory for instruction-practice BUE use for spatial orientation , and finding objects on tray table after repeated practice.)  Grooming Position:  (erect sitting in bed)  Grooming Level of Assistance: Minimum assistance (wash hands and mouth, rinsh mouth with water-assist for finding objects and occrdinating holding basin, drinking water, and spitting.)    Balance:   Static Sit: NT  Dynamic Sit: NT  Static Stand: NT  Dynamic stand: NT    Therapeutic Activities and Exercises (Comments)  The use of sensory cues and two handed approach to performing tasks (to assist spatial orientation) was a new concept to the patient.  He needed initial assist to keep his Left hand touching the plate when scooping, assist using noise from hitting the spoon on the plate to identify spatial location before scooping, repeated practice prior to spooning and drinking to get the spoon and cup to his mouth.  Performance improved with practice.   "However when quizzed at the end of the session, he appeared to have limited memory of what he had learned.    AM-PAC 6 CLICK ADL   How much help from another person does this patient currently need?   1 = Unable, Total/Dependent Assistance  2 = A lot, Maximum/Moderate Assistance  3 = A little, Minimum/Contact Guard/Supervision  4 = None, Modified [place]/Independent    Putting on and taking off regular lower body clothing? : 1  Bathing (including washing, rinsing, drying)?: 1  Toileting, which includes using toilet, bedpan, or urinal? : 1  Putting on and taking off regular upper body clothing?: 1  Taking care of personal grooming such as brushing teeth?: 3  Eating meals?: 2  Total Score: 9     AM-PAC Raw Score CMS "G-Code Modifier Level of Impairment Assistance   6 % Total / Unable   7 - 8 CM 80 - 100% Maximal Assist   9-13 CL 60 - 80% Moderate Assist   14 - 19 CK 40 - 60% Moderate Assist   20 - 22 CJ 20 - 40% Minimal Assist   23 CI 1-20% SBA / CGA   24 CH 0% Independent/ Mod I       Patient left with bed in chair position with all lines intact, call button in reach, bed alarm on and nurse notified    ASSESSMENT:  Rahul Barrios is a 77 y.o. male with a medical diagnosis of Septic shock and presents with impaired endurance, visual deficits, impaired cognition, decreased safety awareness (praxis, memory) effected performance during the session.  He was seen for self-feeding this morning.  He worked on blind bilateral hand technique for scooping food, cup placement and finding objects on the breakfast tray.   There was a need to omit the straw and take small sips to avoid coughing after swallowing his coffee.  Using sensory cues to guide performance was new to the patient with the patient exhibiting limited short term memory for learning at the end of the session.  Continuation of OT treatment is needed to maximize function while in the hospital.    Rehab identified problem list/impairments: Functional " Performance Deficits Effecting Function:: impaired endurance, visual deficits, impaired cognition, decreased safety awareness (praxis, memory)    Rehab potential is good.    Activity tolerance: Good    Discharge recommendations: Discharge Facility/Level Of Care Needs: rehabilitation facility   If he discharges home, he needs 24 hour assist with mobility and self-care    Barriers to discharge: Barriers to Discharge: Inaccessible home environment, Decreased caregiver support    Equipment recommendations: none     GOALS:    Occupational Therapy Goals        Problem: Occupational Therapy Goal    Goal Priority Disciplines Outcome Interventions   Occupational Therapy Goal     OT, PT/OT Ongoing (interventions implemented as appropriate)    Description:  Goals to be met by 8/22/17  1. Max A UBD  2. Retrieve food from plate with verba/sensory cures 50% (MET 7/23/17)      REVISED GOAL: Use Left hand on plate (spatial cue) 90%  with Right hand eating with Min A for food placement  3. Mod A supine-sit EOB  4. Assess bed-chair/BSC transfers                     Plan:  Patient to be seen 6 x/week to address the above listed problems via self-care/home management, therapeutic activities, therapeutic exercises, sensory integration, cognitive retraining  Plan of Care expires: 08/22/17  Plan of Care reviewed with: patient         MARISABEL Santillan  07/23/2017

## 2017-07-24 NOTE — PROGRESS NOTES
Physical Therapy Discharge Summary    Rahul Barrios  MRN: [id number]   Septic shock   Patient Discharged from acute Physical Therapy on 17.  Please refer to prior PT noted date on 17 for functional status.     Assessment:   Patient has not met goals.  GOALS:    Physical Therapy Goals        Problem: Physical Therapy Goal    Goal Priority Disciplines Outcome Goal Variances Interventions   Physical Therapy Goal     PT/OT, PT Ongoing (interventions implemented as appropriate)     Description:  Goals to be met by: 17     Patient will increase functional independence with mobility by performin. Supine to sit with modified independence.   2. Sit to supine with modified independence.   3. Sit<>stand transfer with modified independence using rolling walker.   4. Gait x 50 feet with modified independence using rolling walker.                   Reasons for Discontinuation of Therapy Services  Transfer to alternate level of care.      Plan:  Patient Discharged to: Home with Home Health Service, In-Pt Rehabilitation recommended..

## 2017-07-24 NOTE — PLAN OF CARE
07/23/17 2031   Final Note   Assessment Type Final Discharge Note   Discharge Disposition Home-Health  ([facility] )   Discharge planning education complete? Yes   What phone number can be called within the next 1-3 days to see how you are doing after discharge? ([phone])   Hospital Follow Up  Appt(s) scheduled? No   Offered [facility] -- Bedside Delivery? n/a   Discharge/Hospital Encounter Summary to (non-[facility]) PCP n/a   Referral to Outpatient Case Management complete? n/a   Referral to / orders for Home Health Complete? Yes   30 day supply of medicines given at discharge, if documented non-compliance / non-adherence? n/a   Any social issues identified prior to discharge? No   Did you assess the readiness or willingness of the family or caregiver to support self management of care? n/a

## 2017-07-27 ENCOUNTER — SURGERY (OUTPATIENT)
Age: 77
End: 2017-07-27

## 2017-07-28 ENCOUNTER — TELEPHONE (OUTPATIENT)
Dept: ADMINISTRATIVE | Facility: CLINIC | Age: 77
End: 2017-07-28

## 2017-08-03 ENCOUNTER — TELEPHONE (OUTPATIENT)
Dept: ADMINISTRATIVE | Facility: CLINIC | Age: 77
End: 2017-08-03

## 2017-08-24 ENCOUNTER — SURGERY (OUTPATIENT)
Age: 77
End: 2017-08-24

## 2019-08-16 ENCOUNTER — HOSPITAL ENCOUNTER (EMERGENCY)
Facility: OTHER | Age: 79
Discharge: HOME | End: 2019-08-16
Attending: EMERGENCY MEDICINE
Payer: MEDICARE

## 2019-08-16 VITALS
DIASTOLIC BLOOD PRESSURE: 53 MMHG | OXYGEN SATURATION: 100 % | HEART RATE: 100 BPM | WEIGHT: 200 LBS | HEIGHT: 67 IN | BODY MASS INDEX: 31.39 KG/M2 | RESPIRATION RATE: 16 BRPM | TEMPERATURE: 98 F | SYSTOLIC BLOOD PRESSURE: 113 MMHG

## 2019-08-16 DIAGNOSIS — I95.9 HYPOTENSION: ICD-10-CM

## 2019-08-16 DIAGNOSIS — I95.3 HEMODIALYSIS-ASSOCIATED HYPOTENSION: Primary | ICD-10-CM

## 2019-08-16 LAB — POCT GLUCOSE: 119 MG/DL (ref 70–110)

## 2019-08-16 PROCEDURE — 93010 EKG 12-LEAD: ICD-10-PCS | Mod: ,,, | Performed by: INTERNAL MEDICINE

## 2019-08-16 PROCEDURE — 93005 ELECTROCARDIOGRAM TRACING: CPT

## 2019-08-16 PROCEDURE — 82962 GLUCOSE BLOOD TEST: CPT

## 2019-08-16 PROCEDURE — 93010 ELECTROCARDIOGRAM REPORT: CPT | Mod: ,,, | Performed by: INTERNAL MEDICINE

## 2019-08-16 PROCEDURE — 99283 EMERGENCY DEPT VISIT LOW MDM: CPT | Mod: 25

## 2019-08-16 NOTE — ED PROVIDER NOTES
Encounter Date: 8/16/2019       History     Chief Complaint   Patient presents with    Hypotension     pt with low blood pressure after dialysis today.      The patient is a 79 y.o. male with past medical history of HTN, HLD, anemia, CKD, DM presenting with a chief complaint of hypotension. The patient had just finished dialysis when his pressure dropped. The patient denies any current symptoms. He does not have a headache, and has not experienced any nausea, vomiting or diarrhea. This is the fourth episode of hemodialysis-associated hypotension the patient has had in the past year. The patient is seen by nephrologist Dr. Maldonado. The patient is blind in both eyes. He lives in a nursing home, and is wheelchair bound.      The history is provided by the patient and medical records.     Review of patient's allergies indicates:  No Known Allergies  Past Medical History:   Diagnosis Date    Abnormal blood electrolyte level 06/28/2016    Anemia in chronic kidney disease(285.21)     Dependence on renal dialysis 06/03/2014    Diabetes mellitus     Hyperlipidemia     Hypertension     Infection and inflammatory reaction due to cardiac device, implant, and graft 10/26/2011    Secondary hyperparathyroidism of renal origin 08/15/2011    Unspecified protein-calorie malnutrition 06/18/2013    Vitamin D deficiency 06/28/2016     Past Surgical History:   Procedure Laterality Date    DECLOT- AV FISTULA/GRAFT Left 7/17/2017    Performed [facility]    DECLOT- AV FISTULA/GRAFT Left 5/24/2016    Performed by Rom[facility] L[facility]    DECLOT- AV FISTULA/GRAFT Left 8/27/2015    [facility] C[facility]    FISTULOGRAM Left 10/8/2015    Performed by [facility] CATH LAB    FISTULOGRAM Left 9/9/2015    Performed by Swati [facility]nessy [facility] at [facility] CATH LAB    FISTULOGRAM Left 5/15/2014    Performed by [facility] at [facility] CATH [facility]     ULTRASOUND Left 8/24/2017    Performed by [facility] CATH LAB    ULTRASOUND Left 7/27/2017    Performed by [facility] at [facility] CATH LAB    ULTRASOUND Left 6/2/2016    Performed by [facility] at [facility] CATH LAB    ULTRASOUND Left 5/25/2016    Performed by [facility] CATH LAB    ULTRASOUND Left 10/22/2015    Performed by [facility] CATH LAB    ULTRASOUND Left 10/8/2015    Performed by [facility] at [facility] CATH LAB    ULTRASOUND Left 9/3/2015    Performed by Sw[facility] at [facility] CATH LAB    VASCULAR SURGERY       History reviewed. No pertinent family history.  Social History     Tobacco Use    Smoking status: Never Smoker    Smokeless tobacco: Never Used   Substance Use Topics    Alcohol use: No    Drug use: No     Review of Systems   Constitutional: Negative for chills and fever.   HENT: Negative for sneezing, sore throat and trouble swallowing.    Eyes:        Patient is blind bilaterally.    Respiratory: Negative for cough.    Cardiovascular: Negative for chest pain.   Gastrointestinal: Negative for abdominal pain, diarrhea, nausea and vomiting.   Musculoskeletal: Negative for back pain and neck pain.   Skin: Negative for rash and wound.   Neurological: Negative for speech difficulty and headaches.   Psychiatric/Behavioral: Negative for agitation.       Physical Exam     Initial Vitals [08/16/19 1643]   BP Pulse Resp Temp SpO2   (!) 100/49 106 12 97.8 °F (36.6 °C) 97 %      MAP       --         Physical Exam    Nursing note and vitals reviewed.  Constitutional: He appears well-developed. He is not diaphoretic. No distress.   Patient is obese, in no acute distress.   HENT:   Head: Normocephalic and atraumatic.   Mildly dry mucus membranes.   Eyes:   Blind bilaterally. No pallor or icterus.     Neck: Normal range of motion. Neck supple. No JVD present.   Cardiovascular: Normal rate and regular rhythm. Exam reveals no gallop  and no friction rub.    Murmur heard.  2/6 systolic murmer with loud S2's. Failed AV shunt in left upper extremity. Temporary dialysis catheter in the right subclavian.    Pulmonary/Chest: Breath sounds normal. No stridor. No respiratory distress. He has no wheezes. He has no rhonchi. He has no rales.   Abdominal: Soft. Bowel sounds are normal. He exhibits no distension. There is no tenderness. There is no rebound and no guarding.   Musculoskeletal: He exhibits edema.   Patient is wheelchair bound. 1+ bilateral lower extremity pitting edema up to the knee.   Neurological: He is alert. No cranial nerve deficit.   No facial asymmetry. No gross deficits.    Skin: Skin is warm and dry.   Psychiatric: He has a normal mood and affect.         ED Course   Procedures  Labs Reviewed   POCT GLUCOSE - Abnormal; Notable for the following components:       Result Value    POCT Glucose 119 (*)     All other components within normal limits   POCT GLUCOSE MONITORING CONTINUOUS     EKG Readings: (Independently Interpreted)   Regular tachycardia, rate of 107. unclear P waves. Widened QRS due to RBBB. No change from July 2017. No acute ischemia.        Imaging Results    None          Medical Decision Making:   History:   Old Medical Records: I decided to obtain old medical records.  Independently Interpreted Test(s):   I have ordered and independently interpreted EKG Reading(s) - see prior notes  Clinical Tests:   Lab Tests: Ordered and Reviewed  Medical Tests: Ordered and Reviewed  ED Management:  5:25 PM  Blood pressure has now improved to 124/48 without intervention. I discussed the case with Dr. Logan who is covering for nephrologist Dr. Maldonado. He agrees with the plan for discharge without further workup, and will inform Dr. Maldonado of the patient's repeated visits for post-dialysis hypotension incase adjustments need to be made to dry weight.   Other:   I have discussed this case with another health care provider.             Scribe Attestation:   Scribe #1: I performed the above scribed service and the documentation accurately describes the services I performed. I attest to the accuracy of the note.     Patient presents by EMS due to hypotension after finishing the full amount of dialysis.  EMS reports they have transported him for similar episodes in the past according to their system.  Upon arrival his blood pressure is improved than the reported initial pressures after dialysis.  He is asymptomatic.  Chart review of our records show several visits over the past year for post dialysis hypotension.  Without further intervention his blood pressure has normalized.  He had no chest pain dizziness.  He remains asymptomatic.  I his EKG does not show acute findings he had no arrhythmia while on the monitor I do not think he further workup is necessary as he has just completed dialysis and is not at all likely to have electrolyte abnormality.  As his blood pressures normalized I feel he is stable for discharge home.  Case discussed with his Nephrology service, who is in agreement and will re-evaluate his dialysis orders.           Clinical Impression:       ICD-10-CM ICD-9-CM   1. Hemodialysis-associated hypotension I95.3 458.21   2. Hypotension I95.9 458.9                                Raghav Galindo II, MD  08/16/19 8162

## 2019-08-16 NOTE — ED TRIAGE NOTES
Pt reports to ED via [facility] for hypotension after dialysis session. Per EMS, upon arrival to scene SBP was original 85 but improved en route. Per EMS, pt has had hypotensive episode after dialysis. Pt is currently awake, alert and oriented to self. Pt is blind and wheelchair or bed bound. Pt denies chest pain, SOB or other complaints, pt states he is ready to go back to nursing home.     Pt placed on cardiac monitor with BP cycling and on cont pulse ox.

## 2019-09-02 ENCOUNTER — HOSPITAL ENCOUNTER (EMERGENCY)
Facility: OTHER | Age: 79
Discharge: HOME | End: 2019-09-02
Attending: EMERGENCY MEDICINE
Payer: MEDICARE

## 2019-09-02 VITALS
WEIGHT: 199.94 LBS | BODY MASS INDEX: 27.99 KG/M2 | HEIGHT: 71 IN | DIASTOLIC BLOOD PRESSURE: 55 MMHG | TEMPERATURE: 98 F | HEART RATE: 92 BPM | OXYGEN SATURATION: 94 % | SYSTOLIC BLOOD PRESSURE: 92 MMHG | RESPIRATION RATE: 16 BRPM

## 2019-09-02 DIAGNOSIS — Z99.2 ESRD (END STAGE RENAL DISEASE) ON DIALYSIS: ICD-10-CM

## 2019-09-02 DIAGNOSIS — I95.9 HYPOTENSION: Primary | ICD-10-CM

## 2019-09-02 DIAGNOSIS — N18.6 ESRD (END STAGE RENAL DISEASE) ON DIALYSIS: ICD-10-CM

## 2019-09-02 LAB — POCT GLUCOSE: 208 MG/DL (ref 70–110)

## 2019-09-02 PROCEDURE — 82962 GLUCOSE BLOOD TEST: CPT

## 2019-09-02 PROCEDURE — 93005 ELECTROCARDIOGRAM TRACING: CPT

## 2019-09-02 PROCEDURE — 93010 ELECTROCARDIOGRAM REPORT: CPT | Mod: ,,, | Performed by: INTERNAL MEDICINE

## 2019-09-02 PROCEDURE — 93010 EKG 12-LEAD: ICD-10-PCS | Mod: ,,, | Performed by: INTERNAL MEDICINE

## 2019-09-02 PROCEDURE — 99283 EMERGENCY DEPT VISIT LOW MDM: CPT | Mod: 25

## 2019-09-02 NOTE — ED PROVIDER NOTES
Encounter Date: 9/2/2019    SCRIBE #1 NOTE: INoreen, am scribing for, and in the presence of, Dr. Mojica.       History     Chief Complaint   Patient presents with    hypotensive     per Farhanaian, pt was at dialysis finishing tx, BP 90/40's. per Michael, this happens frequently when at dialysis     Time seen by provider: 4:03 PM    This is a 79 y.o. male who presents with complaint of hypotension. Per nursing note, patient's blood pressure dropped after finishing dialysis.  Paramedics reported systolic blood pressure was in the 90s.  On arrival to ED, systolic blood pressure is in the 100s.  Patient denies any current symptoms. He denies fever, nausea, vomiting, headache, dizziness, or light headedness. He was here on 8/16 with the same presentation. He is blind in both eyes, lives in a nursing home, and is wheelchair bound.    The history is provided by the patient.     Review of patient's allergies indicates:  No Known Allergies  Past Medical History:   Diagnosis Date    Abnormal blood electrolyte level 06/28/2016    Anemia in chronic kidney disease(285.21)     Dependence on renal dialysis 06/03/2014    Diabetes mellitus     Hyperlipidemia     Hypertension     Infection and inflammatory reaction due to cardiac device, implant, and graft 10/26/2011    Secondary hyperparathyroidism of renal origin 08/15/2011    Unspecified protein-calorie malnutrition 06/18/2013    Vitamin D deficiency 06/28/2016     Past Surgical History:   Procedure Laterality Date    DECLOT- AV FISTULA/GRAFT Left 7/17/2017    Performed by [facility] CATH LAB    DECLOT- AV FISTULA/GRAFT Left 5/24/2016    Performed by [facility] at [facility] CATH LAB    DECLOT- AV FISTULA/GRAFT Left 8/27/2015    Performed by [facility] at [facility] CATH LAB    FISTULOGRAM Left 10/8/2015    Performed by [facility] at [facility] CATH LAB    FISTULOGRAM Left 9/9/2015    Performed by [facility] at [facility] CATH  LAB    FISTULOGRAM Left 5/15/2014    [facility] CATH LAB    ULTRASOUND Left 8/24/2017    Performed by [facility] CATH LAB    ULTRASOUND Left 7/27/2017    Performed by [facility] at [facility] CATH LAB    ULTRASOUND Left 6/2/2016    Performed by [facility] at [facility] CATH LAB    ULTRASOUND Left 5/25/2016    Performed by [facility] CATH LAB    ULTRASOUND Left 10/22/2015    Performed by [facility] CATH LAB    ULTRASOUND Left 10/8/2015    Performed by [facility] CATH LAB    ULTRASOUND Left 9/3/2015    Performed by [facility] at [facility] CATH LAB    VASCULAR SURGERY       No family history on file.  Social History     Tobacco Use    Smoking status: Never Smoker    Smokeless tobacco: Never Used   Substance Use Topics    Alcohol use: No    Drug use: No     Review of Systems   Constitutional: Negative for chills and fever.   HENT: Negative for congestion, sore throat and trouble swallowing.    Eyes: Negative for photophobia and visual disturbance.   Respiratory: Negative for cough and shortness of breath.    Cardiovascular: Negative for chest pain.   Gastrointestinal: Negative for abdominal pain, nausea and vomiting.   Genitourinary: Negative for dysuria and hematuria.   Musculoskeletal: Negative for back pain and neck pain.   Skin: Negative for rash and wound.   Neurological: Negative for dizziness, weakness, light-headedness and headaches.   Psychiatric/Behavioral: Negative.        Physical Exam     Initial Vitals   BP Pulse Resp Temp SpO2   09/02/19 1550 09/02/19 1550 -- 09/02/19 1601 09/02/19 1550   (!) 103/49 86  97.8 °F (36.6 °C) 98 %      MAP       --                Physical Exam    Nursing note and vitals reviewed.  Constitutional: He appears well-developed and well-nourished. No distress.   HENT:   Head: Normocephalic and atraumatic.   Eyes: Conjunctivae are normal.   Blindness.   Neck:  Normal range of motion. Neck supple.   Cardiovascular: Normal rate, regular rhythm and normal heart sounds. Exam reveals no gallop and no friction rub.    No murmur heard.  Pulmonary/Chest: Breath sounds normal. No respiratory distress. He has no wheezes. He has no rhonchi. He has no rales.   Dialysis catheter to right anterior chest wall.   Abdominal: Soft. There is no tenderness. There is no rebound and no guarding.   Musculoskeletal: Normal range of motion. He exhibits edema. He exhibits no tenderness.   1+ pitting bilateral pretibial edema.   Neurological: He is alert and oriented to person, place, and time. He has normal strength.   Skin: Skin is dry. No rash noted.   Psychiatric: He has a normal mood and affect. His behavior is normal. Judgment and thought content normal.         ED Course   Procedures  Labs Reviewed   POCT GLUCOSE - Abnormal; Notable for the following components:       Result Value    POCT Glucose 208 (*)     All other components within normal limits   POCT GLUCOSE MONITORING CONTINUOUS          Imaging Results    None          Medical Decision Making:   History:   Old Medical Records: I decided to obtain old medical records.  Clinical Tests:   Lab Tests: Ordered and Reviewed  Medical Tests: Ordered and Reviewed  ED Management:  Urgent evaluation of 79-year-old male who presents with complaint of low blood pressure after receiving full dialysis treatment.  Patient receives dialysis on M/W/F and just received his full treatment.  On arrival to the ED systolic blood pressure is in the 100s, he is asymptomatic.  I discussed the case with Nephrology Dr. Winter, since Dr. Maldonado was not able to be reached.  He recommend contacting the patient's nephrologist for possible increased dry weight since this is been a recurrent problem.  Patient is asymptomatic and blood pressure is now in the 110 systolic without treatment, and I am comfortable with discharge home.  He is discharged in good condition and  encouraged close follow-up.  I did ask Dr. Winter to send message to Dr. Maldonado regarding the case, and I will also send a message through epic.            Scribe Attestation:   Scribe #1: I performed the above scribed service and the documentation accurately describes the services I performed. I attest to the accuracy of the note.    Attending Attestation:           Physician Attestation for Scribe:  Physician Attestation Statement for Scribe #1: I, Dr. Mojica , reviewed documentation, as scribed by Noreen Crook in my presence, and it is both accurate and complete.                 ED Course as of Sep 02 1715   Mon Sep 02, 2019   1609 Paged nephrology.    [AK]   1708 I discussed the case with Dr. Winter, neohrology.  He agrees with discharge planning.    [AK]      ED Course User Index  [AK] Esther Mojica MD     Clinical Impression:     1. Hypotension    2. ESRD (end stage renal disease) on dialysis                                 Esther Mojica MD  09/02/19 3507

## 2019-09-02 NOTE — ED NOTES
Pt to ED via EMS with c/o hypotension. Pt is ESRD requiring dialysis, currently on M, W, F, schedule. Pt tolerated full treatment today, was found to be lethargic after treatment. Dialysis staff found that pt's BP was 90s/30s, activated EMS. This is a recurring problem per EMS report and pt's history in chart. Pt is blind, able to follow simple commands. Pt refusing to be changed into gown, IV access at this time. Pt eating a snack bar without difficulty. Pt connected to continuous cardiac monitoring, pulse ox, BP cycled. Will continue to monitor.     Two patient identifiers have been checked and are correct.      Appearance:  Pt in no acute distress at present time. Pt is clean and well groomed with clothes appropriately fastened. Pt AAOx2, disoriented to place and time. Pt is blind.   Skin: Skin warm, dry & intact. Color consistent with ethnicity. Mucous membranes moist. No breakdown or brusing noted. Healed scarring to BUEs r/t dialysis access. No bruit or thrill to KIRILL. Hemocath to R chest wall.   Musculoskeletal: Purposeful motor response to all extremities. Swelling noted to KIRILL.   Respiratory: Respirations spontaneous, even, and non-labored. Visible chest rise noted. Airway is open and patent. No accessory muscle use noted.   Neurologic: Sensation is intact. Speech is clear and appropriate. Eyes open spontaneously, behavior appropriate to situation, follows commands, facial expression symmetrical, bilateral hand grasp equal and even, purposeful motor response noted.  Cardiac: All peripheral pulses present. No Bilateral lower extremity edema. Cap refill is <3 seconds.  Abdomen: Abdomen soft, non-tender to palpation.   : Pt reports no dysuria or hematuria.

## 2021-12-17 NOTE — ASSESSMENT & PLAN NOTE
I spoke with Dr. Swati Hennessy who says that a small pulmonary embolus following de-clotting procedure is expected and these patients are not typically treated with anticoagulation. Echocardiogram does not show evidence of right-sided heart strain and he is not hypoxic.  I suspect acute worsening mental status more related to sedation from procedure, uremia from inadequate dialysis due to nonfunctioning access, and also aspiration pneumonia. Will discontinue anticoagulation at this time and continue to monitor.   stated